# Patient Record
Sex: MALE | Race: WHITE | NOT HISPANIC OR LATINO | Employment: OTHER | URBAN - NONMETROPOLITAN AREA
[De-identification: names, ages, dates, MRNs, and addresses within clinical notes are randomized per-mention and may not be internally consistent; named-entity substitution may affect disease eponyms.]

---

## 2017-07-24 ENCOUNTER — OFFICE VISIT (OUTPATIENT)
Dept: INTERNAL MEDICINE | Facility: CLINIC | Age: 67
End: 2017-07-24

## 2017-07-24 VITALS
SYSTOLIC BLOOD PRESSURE: 128 MMHG | TEMPERATURE: 98.4 F | WEIGHT: 221 LBS | HEIGHT: 67 IN | OXYGEN SATURATION: 97 % | DIASTOLIC BLOOD PRESSURE: 82 MMHG | BODY MASS INDEX: 34.69 KG/M2 | HEART RATE: 84 BPM

## 2017-07-24 DIAGNOSIS — Z11.59 NEED FOR HEPATITIS C SCREENING TEST: ICD-10-CM

## 2017-07-24 DIAGNOSIS — Z00.00 PREVENTATIVE HEALTH CARE: ICD-10-CM

## 2017-07-24 DIAGNOSIS — Z12.11 SCREEN FOR COLON CANCER: ICD-10-CM

## 2017-07-24 DIAGNOSIS — Z23 NEED FOR PNEUMOCOCCAL VACCINATION: ICD-10-CM

## 2017-07-24 DIAGNOSIS — G25.0 ESSENTIAL TREMOR: Primary | ICD-10-CM

## 2017-07-24 PROCEDURE — G0009 ADMIN PNEUMOCOCCAL VACCINE: HCPCS | Performed by: PHYSICIAN ASSISTANT

## 2017-07-24 PROCEDURE — 90732 PPSV23 VACC 2 YRS+ SUBQ/IM: CPT | Performed by: PHYSICIAN ASSISTANT

## 2017-07-24 PROCEDURE — 99204 OFFICE O/P NEW MOD 45 MIN: CPT | Performed by: PHYSICIAN ASSISTANT

## 2017-07-24 RX ORDER — PROPRANOLOL HYDROCHLORIDE 40 MG/1
TABLET ORAL
Qty: 90 TABLET | Refills: 11 | Status: SHIPPED | OUTPATIENT
Start: 2017-07-24 | End: 2017-10-05

## 2017-07-24 RX ORDER — PROPRANOLOL HYDROCHLORIDE 40 MG/1
40 TABLET ORAL 2 TIMES DAILY
Qty: 60 TABLET | Refills: 11 | Status: SHIPPED | OUTPATIENT
Start: 2017-07-24 | End: 2017-07-24 | Stop reason: SDUPTHER

## 2017-07-24 NOTE — PROGRESS NOTES
Subjective   Adonay Rivas is a 67 y.o. male who presents to [facility] care.    Chief Complaint:  Essential tremors: Present for the last 20 years or so. Well controlled with Propranolol 40 mg bid. Had a grandmother with tremors and his daughter has also been diagnosed.     Varicose veins: worsening in the last 6-8 months and becoming painful. Describes an ache and burning sensation in the lower legs occasionally, particularly after standing for awhile.     Cataracts: Has surgery scheduled for both eyes within the next few weeks.       The following portions of the patient's history were reviewed and updated as appropriate: allergies, current medications, past family history, past medical history, past social history, past surgical history and problem list.      Review of Systems  Review of Systems   Constitutional: Negative for appetite change, chills, fatigue, fever and unexpected weight change.   HENT: Negative for congestion, ear pain, hearing loss, nosebleeds, postnasal drip, rhinorrhea, sore throat, tinnitus and trouble swallowing.    Eyes: Negative for photophobia, discharge and visual disturbance.   Respiratory: Negative for cough, chest tightness, shortness of breath and wheezing.    Cardiovascular: Positive for leg swelling. Negative for chest pain and palpitations.   Gastrointestinal: Negative for abdominal distention, abdominal pain, blood in stool, constipation, diarrhea, nausea and vomiting.   Endocrine: Negative for cold intolerance, heat intolerance, polydipsia, polyphagia and polyuria.   Genitourinary: Negative.    Musculoskeletal: Negative for arthralgias, back pain, joint swelling, myalgias, neck pain and neck stiffness.   Skin: Negative for color change, pallor, rash and wound.   Allergic/Immunologic: Negative for environmental allergies, food allergies and immunocompromised state.   Neurological: Positive for tremors. Negative for dizziness, seizures, weakness, numbness and headaches.  "  Hematological: Negative for adenopathy. Does not bruise/bleed easily.   Psychiatric/Behavioral: Negative for agitation, behavioral problems, confusion, hallucinations, self-injury and suicidal ideas. The patient is not nervous/anxious.          Objective    /82  Pulse 84  Temp 98.4 °F (36.9 °C)  Ht 67\" (170.2 cm)  Wt 221 lb (100 kg)  SpO2 97%  BMI 34.61 kg/m2  Physical Exam   Constitutional: He is oriented to person, place, and time. He appears well-developed and well-nourished. No distress.   HENT:   Head: Normocephalic and atraumatic.   Eyes: EOM are normal. Pupils are equal, round, and reactive to light.   Neck: Normal range of motion. Neck supple.   Cardiovascular: Normal rate, regular rhythm, normal heart sounds, intact distal pulses and normal pulses.  Exam reveals no gallop and no friction rub.    No murmur heard.  Varicosities bilateral lower legs, left worse than right.    Pulmonary/Chest: Effort normal and breath sounds normal. No respiratory distress. He has no wheezes. He has no rales. He exhibits no tenderness.   Abdominal: Soft. Bowel sounds are normal. He exhibits no distension. There is no tenderness.   Musculoskeletal: Normal range of motion.   Neurological: He is alert and oriented to person, place, and time. No cranial nerve deficit. He exhibits normal muscle tone.   Skin: Skin is warm and dry. He is not diaphoretic.   Psychiatric: He has a normal mood and affect. His behavior is normal. Judgment and thought content normal.   Nursing note and vitals reviewed.        Assessment/Plan     1. Essential tremor  Well controlled with Propranolol.   - propranolol (INDERAL) 40 MG tablet; Take 1 tablet by mouth 2-3 times per day for tremor.  Dispense: 90 tablet; Refill: 11    2. Screen for colon cancer  - Ambulatory Referral to General Surgery    3. Need for pneumococcal vaccination  - Pneumococcal Polysaccharide Vaccine 23-Valent Greater Than or Equal To 1yo Subcutaneous / IM    4. " Preventative health care  - Lipid Panel  - Comprehensive Metabolic Panel    5. Need for hepatitis C screening test  - Hepatitis C Antibody        Recommended Wellcome to Medicare Wellness visit in he next few months.

## 2017-07-25 ENCOUNTER — TELEPHONE (OUTPATIENT)
Dept: SURGERY | Facility: CLINIC | Age: 67
End: 2017-07-25

## 2017-07-25 LAB
ALBUMIN SERPL-MCNC: 4.1 G/DL (ref 3.5–5)
ALBUMIN/GLOB SERPL: 1.6 G/DL (ref 1–2)
ALP SERPL-CCNC: 80 U/L (ref 38–126)
ALT SERPL-CCNC: 38 U/L (ref 13–69)
AST SERPL-CCNC: 32 U/L (ref 15–46)
BILIRUB SERPL-MCNC: 1.9 MG/DL (ref 0.2–1.3)
BUN SERPL-MCNC: 13 MG/DL (ref 7–20)
BUN/CREAT SERPL: 14.4 (ref 6.3–21.9)
CALCIUM SERPL-MCNC: 10.3 MG/DL (ref 8.4–10.2)
CHLORIDE SERPL-SCNC: 104 MMOL/L (ref 98–107)
CHOLEST SERPL-MCNC: 162 MG/DL (ref 0–199)
CO2 SERPL-SCNC: 28 MMOL/L (ref 26–30)
CREAT SERPL-MCNC: 0.9 MG/DL (ref 0.6–1.3)
GLOBULIN SER CALC-MCNC: 2.6 GM/DL
GLUCOSE SERPL-MCNC: 94 MG/DL (ref 74–98)
HCV AB S/CO SERPL IA: <0.1 S/CO RATIO (ref 0–0.9)
HDLC SERPL-MCNC: 36 MG/DL (ref 40–60)
LDLC SERPL CALC-MCNC: 109 MG/DL (ref 0–99)
POTASSIUM SERPL-SCNC: 5.2 MMOL/L (ref 3.5–5.1)
PROT SERPL-MCNC: 6.7 G/DL (ref 6.3–8.2)
SODIUM SERPL-SCNC: 140 MMOL/L (ref 137–145)
TRIGL SERPL-MCNC: 85 MG/DL
VLDLC SERPL CALC-MCNC: 17 MG/DL

## 2017-08-04 ENCOUNTER — TELEPHONE (OUTPATIENT)
Dept: INTERNAL MEDICINE | Facility: CLINIC | Age: 67
End: 2017-08-04

## 2017-08-04 ENCOUNTER — TELEPHONE (OUTPATIENT)
Dept: SURGERY | Facility: CLINIC | Age: 67
End: 2017-08-04

## 2017-08-04 NOTE — TELEPHONE ENCOUNTER
I called pt several times to schedule a screening colonoscopy, he did not return my calls, I contacted 's office to inform him. I will mail a letter to pt as well.

## 2017-10-05 ENCOUNTER — OFFICE VISIT (OUTPATIENT)
Dept: INTERNAL MEDICINE | Facility: CLINIC | Age: 67
End: 2017-10-05

## 2017-10-05 VITALS
HEIGHT: 67 IN | SYSTOLIC BLOOD PRESSURE: 115 MMHG | BODY MASS INDEX: 34.39 KG/M2 | HEART RATE: 60 BPM | DIASTOLIC BLOOD PRESSURE: 80 MMHG | TEMPERATURE: 98.6 F | WEIGHT: 219.13 LBS | OXYGEN SATURATION: 97 %

## 2017-10-05 DIAGNOSIS — R35.1 NOCTURIA: ICD-10-CM

## 2017-10-05 DIAGNOSIS — G25.0 ESSENTIAL TREMOR: Primary | ICD-10-CM

## 2017-10-05 LAB — PSA SERPL-MCNC: 0.64 NG/ML (ref 0.06–4)

## 2017-10-05 PROCEDURE — G0008 ADMIN INFLUENZA VIRUS VAC: HCPCS | Performed by: INTERNAL MEDICINE

## 2017-10-05 PROCEDURE — G0402 INITIAL PREVENTIVE EXAM: HCPCS | Performed by: INTERNAL MEDICINE

## 2017-10-05 PROCEDURE — 90662 IIV NO PRSV INCREASED AG IM: CPT | Performed by: INTERNAL MEDICINE

## 2017-10-05 RX ORDER — PROPRANOLOL HCL 60 MG
60 CAPSULE, EXTENDED RELEASE 24HR ORAL DAILY
Qty: 30 CAPSULE | Refills: 5 | Status: SHIPPED | OUTPATIENT
Start: 2017-10-05 | End: 2019-10-01 | Stop reason: SDUPTHER

## 2017-10-05 RX ORDER — SILDENAFIL CITRATE 20 MG/1
60 TABLET ORAL DAILY PRN
Qty: 10 TABLET | Refills: 2 | Status: SHIPPED | OUTPATIENT
Start: 2017-10-05 | End: 2019-10-01

## 2017-10-05 NOTE — PROGRESS NOTES
QUICK REFERENCE INFORMATION:  The ABCs of the Annual Wellness Visit    [facility] to Medicare Visit    HEALTH RISK ASSESSMENT    1950    Recent Hospitalizations:  No hospitalization(s) within the last year..      Current Medical Providers:  Patient Care Team:  Checo Kidd MD as PCP - General (Internal Medicine)      Smoking Status:  History   Smoking Status   • Never Smoker   Smokeless Tobacco   • Never Used       Alcohol Consumption:  History   Alcohol Use No       Depression Screen:   PHQ-2/PHQ-9 Depression Screening 10/5/2017   Little interest or pleasure in doing things 0   Feeling down, depressed, or hopeless 0   Total Score 0       Health Habits and Functional and Cognitive Screening:  Functional & Cognitive Status 10/5/2017   Do you have difficulty preparing food and eating? No   Do you have difficulty bathing yourself? No   Do you have difficulty getting dressed? No   Do you have difficulty using the toilet? No   Do you have difficulty moving around from place to place? No   In the past year have you fallen or experienced a near fall? No   Do you need help using the phone?  No   Are you deaf or do you have serious difficulty hearing?  No   Do you need help with transportation? No   Do you need help shopping? No   Do you need help preparing meals?  No   Do you need help with housework?  No   Do you need help with laundry? No   Do you need help taking your medications? No   Do you need help managing money? No   Do you have difficulty concentrating, remembering or making decisions? No       Health Habits  Current Diet: Well Balanced Diet (very limited junk food.)  Dental Exam: Up to date  Eye Exam: Up to date  Exercise (times per week): 3 times per week  Current Exercise Activities Include: Yard Work        Does the patient have evidence of cognitive impairment? No    Aspirin use counseling? Does not need ASA (and currently is not on it)      Recent Lab Results:  CMP:  Lab Results   Component Value Date     GLU 94 07/24/2017    BUN 13 07/24/2017    CREATININE 0.90 07/24/2017    EGFRIFNONA 85 07/24/2017    EGFRIFAFRI 103 07/24/2017    BCR 14.4 07/24/2017     07/24/2017    K 5.2 (H) 07/24/2017    CO2 28.0 07/24/2017    CALCIUM 10.3 (H) 07/24/2017    PROTENTOTREF 6.7 07/24/2017    ALBUMIN 4.10 07/24/2017    LABGLOBREF 2.6 07/24/2017    LABIL2 1.6 07/24/2017    BILITOT 1.9 (H) 07/24/2017    ALKPHOS 80 07/24/2017    AST 32 07/24/2017    ALT 38 07/24/2017     Lipid Panel:  Lab Results   Component Value Date    TRIG 85 07/24/2017    HDL 36 (L) 07/24/2017    VLDL 17 07/24/2017     HbA1c:       Visual Acuity:  No exam data present    Age-appropriate Screening Schedule:  Refer to the list below for future screening recommendations based on patient's age, sex and/or medical conditions. Orders for these recommended tests are listed in the plan section. The patient has been provided with a written plan.    Health Maintenance   Topic Date Due   • TDAP/TD VACCINES (1 - Tdap) 07/05/1971   • COLONOSCOPY  07/24/2017   • ZOSTER VACCINE  07/24/2017   • INFLUENZA VACCINE  08/01/2017   • PNEUMOCOCCAL VACCINES (65+ LOW/MEDIUM RISK) (2 of 2 - PCV13) 07/24/2018        Subjective   History of Present Illness    Adonay Rivas is a 67 y.o. male an established patient presenting for a Welcome to Medicare Visit.     The following portions of the patient's history were reviewed and updated as appropriate: allergies, current medications, past family history, past medical history, past social history, past surgical history and problem list.    Outpatient Medications Prior to Visit   Medication Sig Dispense Refill   • propranolol (INDERAL) 40 MG tablet Take 1 tablet by mouth 2-3 times per day for tremor. 90 tablet 11     No facility-administered medications prior to visit.        Patient Active Problem List   Diagnosis   • Essential tremor       Advance Care Planning:  has an advance directive - a copy HAS NOT been provided. Have asked the  patient to send this to us to add to record.    Identification of Risk Factors:  Risk factors include: weight .    Review of Systems   Constitutional: Negative.  Negative for activity change, appetite change, fatigue and fever.   HENT: Negative for congestion, ear discharge, ear pain and trouble swallowing.    Eyes: Negative for photophobia and visual disturbance.   Respiratory: Negative for cough and shortness of breath.    Cardiovascular: Negative for chest pain and palpitations.   Gastrointestinal: Negative for abdominal distention, abdominal pain, constipation, diarrhea, nausea and vomiting.   Endocrine: Negative.    Genitourinary: Positive for difficulty urinating. Negative for dysuria, hematuria and urgency.        ED   Musculoskeletal: Negative for arthralgias, back pain, joint swelling and myalgias.   Skin: Negative for color change and rash.   Allergic/Immunologic: Negative.    Neurological: Positive for tremors. Negative for dizziness, weakness, light-headedness and headaches.   Hematological: Negative for adenopathy. Does not bruise/bleed easily.   Psychiatric/Behavioral: Negative for agitation, confusion and dysphoric mood. The patient is not nervous/anxious.        Compared to one year ago, the patient feels his physical health is the same.  Compared to one year ago, the patient feels his mental health is the same.    Objective    Physical Exam   Constitutional: He is oriented to person, place, and time. He appears well-developed and well-nourished. He is cooperative. No distress.   HENT:   Head: Normocephalic and atraumatic.   Right Ear: External ear normal.   Left Ear: External ear normal.   Nose: Nose normal.   Mouth/Throat: Oropharynx is clear and moist. No oropharyngeal exudate.   Eyes: Conjunctivae and EOM are normal. Pupils are equal, round, and reactive to light. Right eye exhibits no discharge. Left eye exhibits no discharge. No scleral icterus.   Neck: Normal range of motion. No tracheal  "deviation present. No thyromegaly present.   Cardiovascular: Normal rate, regular rhythm, normal heart sounds and intact distal pulses.  Exam reveals no gallop and no friction rub.    No murmur heard.  Pulmonary/Chest: Effort normal and breath sounds normal. No stridor. No respiratory distress. He has no wheezes. He has no rales. He exhibits no tenderness.   Abdominal: Soft. Bowel sounds are normal. He exhibits no distension and no mass. There is no tenderness. There is no rebound and no guarding. No hernia.   Genitourinary: Rectum normal and prostate normal.   Musculoskeletal: Normal range of motion. He exhibits no tenderness or deformity.   Lymphadenopathy:     He has no cervical adenopathy.   Neurological: He is alert and oriented to person, place, and time. He has normal reflexes. He displays no tremor. No cranial nerve deficit or sensory deficit. Coordination and gait normal.   tremors   Skin: Skin is warm and dry. No rash noted. No erythema.   Psychiatric: He has a normal mood and affect. His speech is normal and behavior is normal. Judgment and thought content normal. His mood appears not anxious. Cognition and memory are normal. He does not exhibit a depressed mood.       Vitals:    10/05/17 1048   BP: 115/80   Pulse: 60   Temp: 98.6 °F (37 °C)   SpO2: 97%   Weight: 219 lb 2 oz (99.4 kg)   Height: 67\" (170.2 cm)       Body mass index is 34.32 kg/(m^2).  Discussed the patient's BMI with him. The BMI is above average; BMI management plan is completed.    Procedure   Procedures       Assessment/Plan   Patient Self-Management and Personalized Health Advice  The patient has been provided with information about: diet and preventive services including:   · Colorectal cancer screening, colonoscopy referral placed, Exercise counseling provided, Influenza vaccine.    Visit Diagnoses:    ICD-10-CM ICD-9-CM   1. Essential tremor. Titrate propranolol dose up and to LA formulation G25.0 333.1       Orders Placed This " Encounter   Procedures   • Flu Vaccine High Dose PF 65YR+       Outpatient Encounter Prescriptions as of 10/5/2017   Medication Sig Dispense Refill   • propranolol (INDERAL) 40 MG tablet Take 1 tablet by mouth 2-3 times per day for tremor. 90 tablet 11     No facility-administered encounter medications on file as of 10/5/2017.        Reviewed use of high risk medication in the elderly: yes  Reviewed for potential of harmful drug interactions in the elderly: yes    Follow Up:  No Follow-up on file.     An After Visit Summary and PPPS with all of these plans were given to the patient.

## 2017-10-05 NOTE — PATIENT INSTRUCTIONS
Medicare Wellness  Personal Prevention Plan of Service     Date of Office Visit:  10/05/2017  Encounter Provider:  Checo Kidd MD  Place of Service:  [facility]  Patient Name: Adonay Rivas  :  1950    As part of the Medicare Wellness portion of your visit today, we are providing you with this personalized preventive plan of services (PPPS). This plan is based upon recommendations of the [place] Preventive Services Task Force (USPSTF) and the Advisory Committee on Immunization Practices (ACIP).    This lists the preventive care services that should be considered, and provides dates of when you are due. Items listed as completed are up-to-date and do not require any further intervention.    Health Maintenance   Topic Date Due   • TDAP/TD VACCINES (1 - Tdap) 1971   • MEDICARE ANNUAL WELLNESS  2017   • COLONOSCOPY  2017   • ZOSTER VACCINE  2017   • INFLUENZA VACCINE  2017   • PNEUMOCOCCAL VACCINES (65+ LOW/MEDIUM RISK) (2 of 2 - PCV13) 2018   • HEPATITIS C SCREENING  Completed       Orders Placed This Encounter   Procedures   • Flu Vaccine High Dose PF 65YR+   • PSA       No Follow-up on file.

## 2017-10-10 ENCOUNTER — TELEPHONE (OUTPATIENT)
Dept: INTERNAL MEDICINE | Facility: CLINIC | Age: 67
End: 2017-10-10

## 2017-10-10 ENCOUNTER — TELEPHONE (OUTPATIENT)
Dept: SURGERY | Facility: CLINIC | Age: 67
End: 2017-10-10

## 2017-10-10 NOTE — TELEPHONE ENCOUNTER
----- Message from Checo Kidd MD sent at 10/6/2017  1:16 PM EDT -----  Please inform patient labs are okay.

## 2017-10-10 NOTE — TELEPHONE ENCOUNTER
"Per Dr. Kidd's office I scheduled pt for an \"open access' screening colonoscopy @  on 11/13/217, all pertinant information mailed to pt.  "

## 2017-10-11 ENCOUNTER — PREP FOR SURGERY (OUTPATIENT)
Dept: OTHER | Facility: HOSPITAL | Age: 67
End: 2017-10-11

## 2017-10-11 DIAGNOSIS — Z12.11 SCREENING FOR COLON CANCER: Primary | ICD-10-CM

## 2017-10-17 PROBLEM — Z12.11 SCREENING FOR COLON CANCER: Status: ACTIVE | Noted: 2017-10-17

## 2017-10-30 ENCOUNTER — TELEPHONE (OUTPATIENT)
Dept: SURGERY | Facility: CLINIC | Age: 67
End: 2017-10-30

## 2017-10-30 NOTE — TELEPHONE ENCOUNTER
"Pt was scheduled for an \"open access\" colonoscopy, he called the office and I returned his call. He stated that he wanted to cancel his colonoscopy and  reschedule for \"sometime\" in January, he will call back in January to reschedule.  "

## 2019-10-01 ENCOUNTER — HOSPITAL ENCOUNTER (OUTPATIENT)
Dept: CT IMAGING | Facility: HOSPITAL | Age: 69
Discharge: HOME | End: 2019-10-01
Admitting: PHYSICIAN ASSISTANT

## 2019-10-01 ENCOUNTER — OFFICE VISIT (OUTPATIENT)
Dept: INTERNAL MEDICINE | Facility: CLINIC | Age: 69
End: 2019-10-01

## 2019-10-01 VITALS
BODY MASS INDEX: 34.06 KG/M2 | HEIGHT: 67 IN | HEART RATE: 78 BPM | WEIGHT: 217 LBS | TEMPERATURE: 98.2 F | SYSTOLIC BLOOD PRESSURE: 124 MMHG | DIASTOLIC BLOOD PRESSURE: 72 MMHG | OXYGEN SATURATION: 98 %

## 2019-10-01 DIAGNOSIS — R31.0 GROSS HEMATURIA: ICD-10-CM

## 2019-10-01 DIAGNOSIS — N28.1 RENAL CYST: ICD-10-CM

## 2019-10-01 DIAGNOSIS — K76.89 HEPATIC CYST: Primary | ICD-10-CM

## 2019-10-01 DIAGNOSIS — Z87.442 HISTORY OF NEPHROLITHIASIS: ICD-10-CM

## 2019-10-01 DIAGNOSIS — R10.9 RIGHT FLANK PAIN: Primary | ICD-10-CM

## 2019-10-01 LAB
BILIRUB BLD-MCNC: NEGATIVE MG/DL
CLARITY, POC: CLEAR
COLOR UR: YELLOW
GLUCOSE UR STRIP-MCNC: ABNORMAL MG/DL
KETONES UR QL: NEGATIVE
LEUKOCYTE EST, POC: NEGATIVE
NITRITE UR-MCNC: NEGATIVE MG/ML
PH UR: 5.5 [PH] (ref 5–8)
PROT UR STRIP-MCNC: NEGATIVE MG/DL
RBC # UR STRIP: ABNORMAL /UL
SP GR UR: 1.03 (ref 1–1.03)
UROBILINOGEN UR QL: NORMAL

## 2019-10-01 PROCEDURE — 99213 OFFICE O/P EST LOW 20 MIN: CPT | Performed by: PHYSICIAN ASSISTANT

## 2019-10-01 PROCEDURE — 81003 URINALYSIS AUTO W/O SCOPE: CPT | Performed by: PHYSICIAN ASSISTANT

## 2019-10-01 PROCEDURE — 74176 CT ABD & PELVIS W/O CONTRAST: CPT

## 2019-10-01 RX ORDER — PROPRANOLOL HCL 60 MG
60 CAPSULE, EXTENDED RELEASE 24HR ORAL DAILY
Qty: 30 CAPSULE | Refills: 1 | Status: SHIPPED | OUTPATIENT
Start: 2019-10-01 | End: 2019-11-07 | Stop reason: SDUPTHER

## 2019-10-01 NOTE — PROGRESS NOTES
Adonay Rivas is a 69 y.o. male.     Subjective   History of Present Illness   Here today with concern of right flank pain for the last 4 days intermittently which worsened yesterday.  He has also had some right lower abdominal pain.  No fever, chills, nausea, diarrhea, constipation, low back pain, dysuria, hematuria, increased frequency or urgency.  He does have a history of one kidney stone many years ago which required intervention.         The following portions of the patient's history were reviewed and updated as appropriate: allergies, current medications, past family history, past medical history, past social history, past surgical history and problem list.    Review of Systems   Constitutional: Negative for appetite change, chills, fatigue, fever and unexpected weight change.   Eyes: Negative for visual disturbance.   Respiratory: Negative for cough, chest tightness, shortness of breath and wheezing.    Cardiovascular: Negative for chest pain, palpitations and leg swelling.   Gastrointestinal: Positive for abdominal pain. Negative for abdominal distention, anal bleeding, blood in stool, constipation, diarrhea, nausea, rectal pain and vomiting.   Endocrine: Negative for heat intolerance, polydipsia, polyphagia and polyuria.   Genitourinary: Positive for flank pain. Negative for decreased urine volume, difficulty urinating, dysuria, enuresis, frequency, hematuria, penile pain, penile swelling, scrotal swelling, testicular pain and urgency.   Musculoskeletal: Positive for back pain. Negative for arthralgias, gait problem, joint swelling, myalgias, neck pain and neck stiffness.   Skin: Negative.    Allergic/Immunologic: Negative for immunocompromised state.   Neurological: Negative for dizziness, tremors, syncope, facial asymmetry, numbness and headaches.   Hematological: Negative for adenopathy. Does not bruise/bleed easily.   Psychiatric/Behavioral: Negative for behavioral problems, decreased concentration,  "dysphoric mood, self-injury and suicidal ideas. The patient is not nervous/anxious.          Objective    Physical Exam   Constitutional: He is oriented to person, place, and time. He appears well-developed and well-nourished. No distress.   HENT:   Head: Normocephalic and atraumatic.   Eyes: Conjunctivae and EOM are normal. Pupils are equal, round, and reactive to light.   Neck: Normal range of motion. Neck supple.   Cardiovascular: Normal rate, regular rhythm and normal heart sounds. Exam reveals no gallop and no friction rub.   No murmur heard.  Pulmonary/Chest: Effort normal and breath sounds normal. No stridor. No respiratory distress. He has no wheezes. He has no rales. He exhibits no tenderness.   Abdominal: Soft. Bowel sounds are normal. He exhibits no distension and no mass. There is tenderness. There is CVA tenderness. There is no rebound and no guarding. No hernia.   Musculoskeletal: Normal range of motion. He exhibits no edema, tenderness or deformity.   Lymphadenopathy:     He has no cervical adenopathy.   Neurological: He is alert and oriented to person, place, and time. No cranial nerve deficit. Coordination normal.   Skin: Skin is warm and dry. Capillary refill takes less than 2 seconds. No rash noted. He is not diaphoretic.   Psychiatric: He has a normal mood and affect. His behavior is normal. Judgment and thought content normal.   Nursing note and vitals reviewed.        /72   Pulse 78   Temp 98.2 °F (36.8 °C)   Ht 170.2 cm (67.01\")   Wt 98.4 kg (217 lb)   SpO2 98%   BMI 33.98 kg/m²     Nursing note and vitals reviewed.          Assessment/Plan   Adonay was seen today for flank pain.    Diagnoses and all orders for this visit:    Right flank pain  -     POC Urinalysis Dipstick, Automated  -     Urine Culture - Urine, Urine, Clean Catch  -     CT Abdomen Pelvis Stone Protocol    Brief Urine Lab Results  (Last result in the past 365 days)      Color   Clarity   Blood   Leuk Est   Nitrite "   Protein   CREAT   Urine HCG        10/01/19 1428 Yellow Clear 250 Mark/ul Negative Negative Negative             Gross hematuria  -     CT Abdomen Pelvis Stone Protocol    History of nephrolithiasis  -     CT Abdomen Pelvis Stone Protocol      Suspect right nephrolithiasis given symptoms and physical exam findings.  Since his only other experience with a kidney stone required intervention he is in favor of proceeding with CT stone protocol.    He was encouraged to increase water intake and use Tylenol and/or ibuprofen as needed for pain control.

## 2019-10-03 LAB
BACTERIA UR CULT: NORMAL
BACTERIA UR CULT: NORMAL

## 2019-11-07 ENCOUNTER — OFFICE VISIT (OUTPATIENT)
Dept: INTERNAL MEDICINE | Facility: CLINIC | Age: 69
End: 2019-11-07

## 2019-11-07 ENCOUNTER — TELEPHONE (OUTPATIENT)
Dept: SURGERY | Facility: CLINIC | Age: 69
End: 2019-11-07

## 2019-11-07 VITALS
BODY MASS INDEX: 33.59 KG/M2 | HEIGHT: 67 IN | HEART RATE: 85 BPM | SYSTOLIC BLOOD PRESSURE: 125 MMHG | WEIGHT: 214 LBS | OXYGEN SATURATION: 100 % | TEMPERATURE: 97.8 F | DIASTOLIC BLOOD PRESSURE: 84 MMHG

## 2019-11-07 DIAGNOSIS — E78.00 PURE HYPERCHOLESTEROLEMIA: ICD-10-CM

## 2019-11-07 DIAGNOSIS — Z12.11 SCREEN FOR COLON CANCER: ICD-10-CM

## 2019-11-07 DIAGNOSIS — M79.604 PAIN OF RIGHT LOWER EXTREMITY: Primary | ICD-10-CM

## 2019-11-07 LAB
ALBUMIN SERPL-MCNC: 4.4 G/DL (ref 3.5–5.2)
ALBUMIN/GLOB SERPL: 2.1 G/DL
ALP SERPL-CCNC: 105 U/L (ref 39–117)
ALT SERPL-CCNC: 17 U/L (ref 1–41)
AST SERPL-CCNC: 18 U/L (ref 1–40)
BILIRUB SERPL-MCNC: 1.1 MG/DL (ref 0.2–1.2)
BUN SERPL-MCNC: 11 MG/DL (ref 8–23)
BUN/CREAT SERPL: 11.8 (ref 7–25)
CALCIUM SERPL-MCNC: 10.1 MG/DL (ref 8.6–10.5)
CHLORIDE SERPL-SCNC: 102 MMOL/L (ref 98–107)
CHOLEST SERPL-MCNC: 139 MG/DL (ref 0–200)
CO2 SERPL-SCNC: 28.2 MMOL/L (ref 22–29)
CREAT SERPL-MCNC: 0.93 MG/DL (ref 0.76–1.27)
GLOBULIN SER CALC-MCNC: 2.1 GM/DL
GLUCOSE SERPL-MCNC: 93 MG/DL (ref 65–99)
HDLC SERPL-MCNC: 36 MG/DL (ref 40–60)
LDLC SERPL CALC-MCNC: 87 MG/DL (ref 0–100)
POTASSIUM SERPL-SCNC: 4.7 MMOL/L (ref 3.5–5.2)
PROT SERPL-MCNC: 6.5 G/DL (ref 6–8.5)
SODIUM SERPL-SCNC: 140 MMOL/L (ref 136–145)
TRIGL SERPL-MCNC: 78 MG/DL (ref 0–150)
VLDLC SERPL CALC-MCNC: 15.6 MG/DL

## 2019-11-07 PROCEDURE — G0439 PPPS, SUBSEQ VISIT: HCPCS | Performed by: INTERNAL MEDICINE

## 2019-11-07 RX ORDER — PROPRANOLOL HCL 60 MG
60 CAPSULE, EXTENDED RELEASE 24HR ORAL DAILY
Qty: 90 CAPSULE | Refills: 3 | Status: SHIPPED | OUTPATIENT
Start: 2019-11-07 | End: 2022-04-01 | Stop reason: SDUPTHER

## 2019-11-07 RX ORDER — PROPRANOLOL HYDROCHLORIDE 40 MG/1
40 TABLET ORAL 3 TIMES DAILY PRN
Qty: 60 TABLET | Refills: 2 | Status: SHIPPED | OUTPATIENT
Start: 2019-11-07

## 2019-11-07 RX ORDER — LATANOPROST 50 UG/ML
1 SOLUTION/ DROPS OPHTHALMIC DAILY
Refills: 3 | COMMUNITY
Start: 2019-10-02

## 2019-11-07 NOTE — PROGRESS NOTES
The ABCs of the Annual Wellness Visit  Subsequent Medicare Wellness Visit    Chief Complaint   Patient presents with   • Medicare Wellness-subsequent     nerve issue right hip, shoots down leg, comes and goes        Subjective   History of Present Illness:  Adonay Rivas is a 69 y.o. male who presents for a Subsequent Medicare Wellness Visit.    HEALTH RISK ASSESSMENT    Recent Hospitalizations:  No hospitalization(s) within the last year.    Current Medical Providers:  Patient Care Team:  Checo Kidd MD as PCP - General (Internal Medicine)  Makenzie Moura MD as PCP - Claims Attributed    Smoking Status:  Social History     Tobacco Use   Smoking Status Never Smoker   Smokeless Tobacco Never Used       Alcohol Consumption:  Social History     Substance and Sexual Activity   Alcohol Use No       Depression Screen:   PHQ-2/PHQ-9 Depression Screening 11/7/2019   Little interest or pleasure in doing things 0   Feeling down, depressed, or hopeless 0   Total Score 0       Fall Risk Screen:  PRAVIN Fall Risk Assessment was completed, and patient is at LOW risk for falls.Assessment completed on:11/7/2019    Health Habits and Functional and Cognitive Screening:  Functional & Cognitive Status 11/7/2019   Do you have difficulty preparing food and eating? No   Do you have difficulty bathing yourself, getting dressed or grooming yourself? No   Do you have difficulty using the toilet? No   Do you have difficulty moving around from place to place? No   Do you have trouble with steps or getting out of a bed or a chair? No   Current Diet Well Balanced Diet   Dental Exam Up to date   Eye Exam Up to date   Exercise (times per week) 0 times per week   Current Exercise Activities Include No Regular Exercise   Do you need help using the phone?  No   Are you deaf or do you have serious difficulty hearing?  No   Do you need help with transportation? No   Do you need help shopping? No   Do you need help preparing meals?  No   Do you  need help with housework?  No   Do you need help with laundry? No   Do you need help taking your medications? No   Do you need help managing money? No   Do you ever drive or ride in a car without wearing a seat belt? No   Have you felt unusual stress, anger or loneliness in the last month? No   Who do you live with? Spouse   If you need help, do you have trouble finding someone available to you? No   Have you been bothered in the last four weeks by sexual problems? No   Do you have difficulty concentrating, remembering or making decisions? No         Does the patient have evidence of cognitive impairment? No    Asprin use counseling:Does not need ASA (and currently is not on it)    Age-appropriate Screening Schedule:  Refer to the list below for future screening recommendations based on patient's age, sex and/or medical conditions. Orders for these recommended tests are listed in the plan section. The patient has been provided with a written plan.    Health Maintenance   Topic Date Due   • COLONOSCOPY  07/24/2017   • TDAP/TD VACCINES (1 - Tdap) 11/07/2020 (Originally 7/5/1969)   • PNEUMOCOCCAL VACCINES (65+ LOW/MEDIUM RISK) (2 of 2 - PCV13) 11/07/2029 (Originally 7/24/2018)   • ZOSTER VACCINE (1 of 2) 11/07/2029 (Originally 7/5/2002)   • INFLUENZA VACCINE  Completed          The following portions of the patient's history were reviewed and updated as appropriate: allergies, current medications, past family history, past medical history, past social history, past surgical history and problem list.    Outpatient Medications Prior to Visit   Medication Sig Dispense Refill   • latanoprost (XALATAN) 0.005 % ophthalmic solution INSTILL 1 DROP INTO EACH EYE ONCE DAILY AT NIGHT AT BEDTIME  3   • propranolol LA (INDERAL LA) 60 MG 24 hr capsule Take 1 capsule by mouth Daily. 30 capsule 1     No facility-administered medications prior to visit.        Patient Active Problem List   Diagnosis   • Essential tremor   • Screening  "for colon cancer       Advanced Care Planning:  Patient has an advance directive - a copy has not been provided. Have asked the patient to send this to us to add to record    Review of Systems   Constitutional: Negative.  Negative for activity change, appetite change, fatigue and fever.   HENT: Negative for congestion, ear discharge, ear pain and trouble swallowing.    Eyes: Negative for photophobia and visual disturbance.   Respiratory: Negative for cough and shortness of breath.    Cardiovascular: Negative for chest pain and palpitations.   Gastrointestinal: Negative for abdominal distention, abdominal pain, constipation, diarrhea, nausea and vomiting.   Endocrine: Negative.    Genitourinary: Negative for dysuria, hematuria and urgency.   Musculoskeletal: Positive for arthralgias. Negative for back pain, joint swelling and myalgias.   Skin: Negative for color change and rash.   Allergic/Immunologic: Negative.    Neurological: Negative for dizziness, weakness, light-headedness and headaches.   Hematological: Negative for adenopathy. Does not bruise/bleed easily.   Psychiatric/Behavioral: Negative for agitation, confusion and dysphoric mood. The patient is not nervous/anxious.        Compared to one year ago, the patient feels his physical health is the same.  Compared to one year ago, the patient feels his mental health is the same.    Reviewed chart for potential of high risk medication in the elderly: yes  Reviewed chart for potential of harmful drug interactions in the elderly:yes    Objective         Vitals:    11/07/19 0933   BP: 125/84  Comment: Automatic   Pulse: 85   Temp: 97.8 °F (36.6 °C)   TempSrc: Temporal   SpO2: 100%   Weight: 97.1 kg (214 lb)   Height: 170.2 cm (67\")   PainSc: 0-No pain       Body mass index is 33.52 kg/m².  Discussed the patient's BMI with him. The BMI is above average; BMI management plan is completed.    Physical Exam   Constitutional: He is oriented to person, place, and time. He " appears well-developed and well-nourished. No distress.   HENT:   Nose: Nose normal.   Mouth/Throat: Oropharynx is clear and moist.   Eyes: Conjunctivae and EOM are normal. No scleral icterus.   Neck: No tracheal deviation present. No thyromegaly present.   Cardiovascular: Normal rate and regular rhythm. Exam reveals no friction rub.   No murmur heard.  Pulmonary/Chest: No respiratory distress. He has no wheezes. He has no rales.   Abdominal: Soft. He exhibits no distension and no mass. There is no tenderness. There is no guarding.   Musculoskeletal: Normal range of motion. He exhibits no deformity.   Lymphadenopathy:     He has no cervical adenopathy.   Neurological: He is alert and oriented to person, place, and time. He has normal reflexes. No cranial nerve deficit. Coordination normal.   tremors   Skin: Skin is warm and dry. No rash noted. No erythema.   Psychiatric: He has a normal mood and affect. His behavior is normal. Judgment and thought content normal.             Assessment/Plan   Medicare Risks and Personalized Health Plan  CMS Preventative Services Quick Reference  Advance Directive Discussion    The above risks/problems have been discussed with the patient.  Pertinent information has been shared with the patient in the After Visit Summary.  Follow up plans and orders are seen below in the Assessment/Plan Section.    Diagnoses and all orders for this visit:    1. Pain of right lower extremity (Primary) has had episodic shooting pain in his right lower extremity this occurs 2-3 times a month.  Will observe for now.  Exam of the hip joint and back is unremarkable.  No rash noted has not had similar complaints in the past.  Will pursue further investigations if symptoms persist or get exacerbated.  Screening colonoscopy scheduled  Check lipid and CMP    Other orders  -     propranolol LA (INDERAL LA) 60 MG 24 hr capsule; Take 1 capsule by mouth Daily.  Dispense: 90 capsule; Refill: 3      Follow Up:  No  Follow-up on file.     An After Visit Summary and PPPS were given to the patient.

## 2019-11-22 ENCOUNTER — PREP FOR SURGERY (OUTPATIENT)
Dept: OTHER | Facility: HOSPITAL | Age: 69
End: 2019-11-22

## 2019-11-22 ENCOUNTER — TELEPHONE (OUTPATIENT)
Dept: SURGERY | Facility: CLINIC | Age: 69
End: 2019-11-22

## 2019-11-22 DIAGNOSIS — Z12.11 COLON CANCER SCREENING: Primary | ICD-10-CM

## 2019-11-22 RX ORDER — BISACODYL 5 MG/1
TABLET, DELAYED RELEASE ORAL
Qty: 4 TABLET | Refills: 0 | Status: SHIPPED | OUTPATIENT
Start: 2019-11-22 | End: 2021-02-25

## 2019-11-22 RX ORDER — POLYETHYLENE GLYCOL 3350 17 G/17G
238 POWDER, FOR SOLUTION ORAL ONCE
Qty: 1 EACH | Refills: 1 | Status: SHIPPED | OUTPATIENT
Start: 2019-11-22 | End: 2019-11-22

## 2019-11-22 NOTE — TELEPHONE ENCOUNTER
"Pt scheduled for open access screening colonoscopyPRESCREENING FOR OPEN ACCESS SCHEDULING    Adonay Rivas, 1950  [id number]    11/22/19    If, the patient answers yes to any of the following questions the provider will be informed prior to scheduling open access for approval and documented in the chart.    [x]  Yes  [] No    1. Have you ever had a colonoscopy in the past?      When:  \"several years\"      Where:       Polyps or other:     []  Yes  [x] No    2. Family history of colon cancer?      Relation:       Age of onset:       Do you currently have any of the following?    []  Yes  [x] No  Rectal bleeding, if so, how long?     []  Yes  [x] No  Abdominal pain, if so, how long?    []  Yes  [x] No  Constipation, if so, how long?    []  Yes  [x] No  Diarrhea, if so, how long?    []  Yes  [x] No  Weight loss, is so, how much?    [] Yes  [x] No  Small caliber stool, if so, how long?      Have you ever had any of the following conditions?    [] Yes  [x] No  Heart attack?      When?       Last cardiac workup?     Blood thinners?    [] Yes  [x] No   Lung problems, asthma or COPD?  [] Yes  [x] No  Oxygen required?       [] Yes  [x] No  Stroke?     [] Yes  [x] No  Have you ever had a reaction to anesthesia?          @  on 01/13/2020.  "

## 2019-11-27 PROBLEM — Z12.11 COLON CANCER SCREENING: Status: ACTIVE | Noted: 2019-11-27

## 2020-01-13 ENCOUNTER — ANESTHESIA (OUTPATIENT)
Dept: GASTROENTEROLOGY | Facility: HOSPITAL | Age: 70
End: 2020-01-13

## 2020-01-13 ENCOUNTER — HOSPITAL ENCOUNTER (OUTPATIENT)
Facility: HOSPITAL | Age: 70
Setting detail: HOSPITAL OUTPATIENT SURGERY
Discharge: HOME | End: 2020-01-13
Attending: SURGERY | Admitting: SURGERY

## 2020-01-13 ENCOUNTER — ANESTHESIA EVENT (OUTPATIENT)
Dept: GASTROENTEROLOGY | Facility: HOSPITAL | Age: 70
End: 2020-01-13

## 2020-01-13 VITALS
DIASTOLIC BLOOD PRESSURE: 64 MMHG | RESPIRATION RATE: 20 BRPM | SYSTOLIC BLOOD PRESSURE: 118 MMHG | HEIGHT: 67 IN | TEMPERATURE: 98.2 F | BODY MASS INDEX: 32.21 KG/M2 | OXYGEN SATURATION: 94 % | HEART RATE: 72 BPM | WEIGHT: 205.25 LBS

## 2020-01-13 DIAGNOSIS — Z12.11 COLON CANCER SCREENING: ICD-10-CM

## 2020-01-13 PROCEDURE — 25010000002 FENTANYL CITRATE (PF) 100 MCG/2ML SOLUTION: Performed by: NURSE ANESTHETIST, CERTIFIED REGISTERED

## 2020-01-13 PROCEDURE — 25010000002 PROPOFOL 1000 MG/100ML EMULSION: Performed by: NURSE ANESTHETIST, CERTIFIED REGISTERED

## 2020-01-13 PROCEDURE — 25010000002 MIDAZOLAM PER 1MG: Performed by: NURSE ANESTHETIST, CERTIFIED REGISTERED

## 2020-01-13 PROCEDURE — 88305 TISSUE EXAM BY PATHOLOGIST: CPT | Performed by: SURGERY

## 2020-01-13 PROCEDURE — S0260 H&P FOR SURGERY: HCPCS | Performed by: SURGERY

## 2020-01-13 RX ORDER — FENTANYL CITRATE 50 UG/ML
INJECTION, SOLUTION INTRAMUSCULAR; INTRAVENOUS AS NEEDED
Status: DISCONTINUED | OUTPATIENT
Start: 2020-01-13 | End: 2020-01-13 | Stop reason: SURG

## 2020-01-13 RX ORDER — SODIUM CHLORIDE 0.9 % (FLUSH) 0.9 %
10 SYRINGE (ML) INJECTION AS NEEDED
Status: DISCONTINUED | OUTPATIENT
Start: 2020-01-13 | End: 2020-01-13 | Stop reason: HOSPADM

## 2020-01-13 RX ORDER — ONDANSETRON 2 MG/ML
4 INJECTION INTRAMUSCULAR; INTRAVENOUS ONCE AS NEEDED
Status: DISCONTINUED | OUTPATIENT
Start: 2020-01-13 | End: 2020-01-13 | Stop reason: HOSPADM

## 2020-01-13 RX ORDER — SODIUM CHLORIDE, SODIUM LACTATE, POTASSIUM CHLORIDE, CALCIUM CHLORIDE 600; 310; 30; 20 MG/100ML; MG/100ML; MG/100ML; MG/100ML
1000 INJECTION, SOLUTION INTRAVENOUS CONTINUOUS
Status: DISCONTINUED | OUTPATIENT
Start: 2020-01-13 | End: 2020-01-13 | Stop reason: HOSPADM

## 2020-01-13 RX ORDER — LIDOCAINE HYDROCHLORIDE 20 MG/ML
INJECTION, SOLUTION INTRAVENOUS AS NEEDED
Status: DISCONTINUED | OUTPATIENT
Start: 2020-01-13 | End: 2020-01-13 | Stop reason: SURG

## 2020-01-13 RX ORDER — MIDAZOLAM HYDROCHLORIDE 2 MG/2ML
INJECTION, SOLUTION INTRAMUSCULAR; INTRAVENOUS AS NEEDED
Status: DISCONTINUED | OUTPATIENT
Start: 2020-01-13 | End: 2020-01-13 | Stop reason: SURG

## 2020-01-13 RX ORDER — PROPOFOL 10 MG/ML
INJECTION, EMULSION INTRAVENOUS AS NEEDED
Status: DISCONTINUED | OUTPATIENT
Start: 2020-01-13 | End: 2020-01-13 | Stop reason: SURG

## 2020-01-13 RX ORDER — MAGNESIUM HYDROXIDE 1200 MG/15ML
LIQUID ORAL AS NEEDED
Status: DISCONTINUED | OUTPATIENT
Start: 2020-01-13 | End: 2020-01-13 | Stop reason: HOSPADM

## 2020-01-13 RX ADMIN — MIDAZOLAM HYDROCHLORIDE 2 MG: 1 INJECTION, SOLUTION INTRAMUSCULAR; INTRAVENOUS at 07:19

## 2020-01-13 RX ADMIN — PROPOFOL 50 MG: 10 INJECTION, EMULSION INTRAVENOUS at 07:19

## 2020-01-13 RX ADMIN — LIDOCAINE HYDROCHLORIDE 100 MG: 20 INJECTION, SOLUTION INTRAVENOUS at 07:19

## 2020-01-13 RX ADMIN — FENTANYL CITRATE 100 MCG: 50 INJECTION, SOLUTION INTRAMUSCULAR; INTRAVENOUS at 07:19

## 2020-01-13 RX ADMIN — SODIUM CHLORIDE, POTASSIUM CHLORIDE, SODIUM LACTATE AND CALCIUM CHLORIDE 1000 ML: 600; 310; 30; 20 INJECTION, SOLUTION INTRAVENOUS at 06:30

## 2020-01-13 RX ADMIN — PROPOFOL 50 MG: 10 INJECTION, EMULSION INTRAVENOUS at 07:27

## 2020-01-13 NOTE — H&P
[facility]   HISTORY AND PHYSICAL      Name:  Adonay Rivas   Age:  69 y.o.  Sex:  male  :  1950  MRN:  [id number]   Visit Number:  [id number]  Admission Date:  2020  Date Of Service:  20  Primary Care Physician:  Checo Kidd MD    Chief Complaint:     Colon cancer screening    History Of Presenting Illness:      Patient here for routine colonoscopy.  Last was over 5 years ago.  Patient with no history of polyps and no family history of colon cancer.  No rectal bleeding or abdominal pain.    Review Of Systems:     General ROS: Patient denies any fevers, chills or loss of consciousness.  No complaints of generalized weakness  Psychological ROS: Denies any hallucinations and delusions.  Ophthalmic ROS: no transient loss of vision.  ENT ROS: Denies sore throat, nasal congestion or ear pain.   Allergy and Immunology ROS: Denies rash or itching.  Hematological and Lymphatic ROS: Denies neck swelling or easy bleeding.  Endocrine ROS: Denies any recent unintentional weight gain or loss.  Breast ROS: Denies any pain or swelling.  Respiratory ROS: Denies cough or shortness of breath.   Cardiovascular ROS: Denies chest pain or palpitations. No history of exertional chest pain.   Gastrointestinal ROS: Denies nausea and vomiting. Denies any abdominal pain. No diarrhea.   Genito-Urinary ROS: Denies dysuria or hematuria.  Musculoskeletal ROS: no back pain. No muscle pain. No calf pain.   Neurological ROS: Denies any focal weakness. No loss of consciousness. Denies any numbness.   Dermatological ROS: Denies any redness or pruritis.     Past Medical History:    Past Medical History:   Diagnosis Date   • Cataract    • Kidney stone    • Tremors of nervous system     essential        Past Surgical history:    Past Surgical History:   Procedure Laterality Date   • CATARACT EXTRACTION, BILATERAL  2020   • VASECTOMY         Social History:    Social History      Socioeconomic History   • Marital status:      Spouse name: Not on file   • Number of children: Not on file   • Years of education: Not on file   • Highest education level: Not on file   Tobacco Use   • Smoking status: Never Smoker   • Smokeless tobacco: Never Used   Substance and Sexual Activity   • Alcohol use: No   • Drug use: No   • Sexual activity: Defer       Family History:    Family History   Problem Relation Age of Onset   • Cancer Mother    • Hyperlipidemia Mother    • Kidney disease Mother    • Stroke Mother    • Cancer Sister    • Cancer Brother        Allergies:      Patient has no known allergies.    Home Medications:    Prior to Admission Medications     Prescriptions Last Dose Informant Patient Reported? Taking?    bisacodyl (DULCOLAX) 5 MG EC tablet 1/12/2020  No Yes    Take 2 @ 3pm, 2 @ 7 pm day prior to colonoscopy    latanoprost (XALATAN) 0.005 % ophthalmic solution Past Week Self Yes Yes    Administer 1 drop to both eyes Daily.    propranolol (INDERAL) 40 MG tablet Past Week Self No Yes    Take 1 tablet by mouth 3 (Three) Times a Day As Needed (tremors).    propranolol LA (INDERAL LA) 60 MG 24 hr capsule Past Week Self No Yes    Take 1 capsule by mouth Daily.             ED Medications:    Medications   sodium chloride 0.9 % flush 10 mL (has no administration in time range)   lactated ringers infusion 1,000 mL (1,000 mL Intravenous New Bag 1/13/20 0630)       Vital Signs:    Temp:  [97.6 °F (36.4 °C)] 97.6 °F (36.4 °C)  Heart Rate:  [107] 107  Resp:  [16] 16  BP: (158)/(93) 158/93        01/02/20  1035 01/13/20  0629   Weight: 95.3 kg (210 lb) 93.1 kg (205 lb 4 oz)       Body mass index is 32.15 kg/m².    Physical Exam:      General Appearance:  Alert and cooperative, not in any acute distress.   Head:  Atraumatic and normocephalic, without obvious abnormality.   Eyes:          PERRLA, conjunctivae and sclerae normal, no Icterus. No pallor. Extraocular movements are within normal  limits.   Ears:  Ears appear intact with no abnormalities noted.   Throat: No oral lesions, no thrush, oral mucosa moist.   Neck: Supple, trachea midline, no thyromegaly, no carotid bruit.       Respiratory/Lungs:   Breath sounds heard bilaterally equally.  No crackles or wheezing. No Pleural rub or bronchial breathing. Normal respiratory effort.    Cardiovascular/Heart:  Normal S1 and S2, no murmur. No edema   GI/Abdomen:   No masses, no hepatosplenomegaly. Soft, non-tender, non-distended, no hernia                 Musculoskeletal/ Extremities:   Moves all extremities well   Pulses: Pulses palpable and equal bilaterally   Skin: No bleeding, bruising or rash, no induration   Lymph nodes: No palpable adenopathy   Psychiatric : Alert and oriented ×3.  No depression or anxiety            EKG:      None    Labs:    Lab Results (last 24 hours)     ** No results found for the last 24 hours. **          Radiology:    Imaging Results (Last 72 Hours)     ** No results found for the last 72 hours. **          Assessment:    Colon cancer screening, average risk    Plan:     Colonoscopy today, risk of bleeding perforation discussed and patient agreeable    Cindy Terry MD  01/13/20  7:15 AM

## 2020-01-13 NOTE — ANESTHESIA PREPROCEDURE EVALUATION
Anesthesia Evaluation     Patient summary reviewed and Nursing notes reviewed   no history of anesthetic complications:  NPO Solid Status: > 8 hours  NPO Liquid Status: > 8 hours           Airway   Mallampati: II  TM distance: >3 FB  Neck ROM: full  No difficulty expected  Dental - normal exam     Pulmonary - negative pulmonary ROS and normal exam   (-) not a smoker  Cardiovascular - negative cardio ROS and normal exam  Exercise tolerance: good (4-7 METS)    Patient on routine beta blocker and Beta blocker not taken-may be given intraoperatively        Neuro/Psych  (+) tremors,       ROS Comment: Hx of essential tremor  GI/Hepatic/Renal/Endo    (+)   renal disease stones,     Musculoskeletal (-) negative ROS    Abdominal  - normal exam   Substance History - negative use  (-) alcohol use, drug use     OB/GYN negative ob/gyn ROS         Other        ROS/Med Hx Other: cataracts                  Anesthesia Plan    ASA 2     MAC   (Patient advised that intravenous sedation would be utilized as primary anesthetic technique. Every effort will be made to make sure patient is comfortable. Patient advised that they may experience recall of events of the procedure. Patient verbalized understanding and agreed to plan. )  intravenous induction     Anesthetic plan, all risks, benefits, and alternatives have been provided, discussed and informed consent has been obtained with: patient.    Plan discussed with CRNA.

## 2020-01-13 NOTE — ANESTHESIA POSTPROCEDURE EVALUATION
Patient: Adonay Rivas    Procedure Summary     Date:  01/13/20 Room / Location:  [facility] ENDOSCOPY 1 / [facility] ENDOSCOPY    Anesthesia Start:  0717 Anesthesia Stop:  0746    Procedure:  COLONOSCOPY with hot biopsy polypectomies (N/A ) Diagnosis:       Colon cancer screening      (Colon cancer screening [Z12.11])    Surgeon:  Cindy Terry MD Provider:  Jakob Gilbert CRNA    Anesthesia Type:  MAC ASA Status:  2          Anesthesia Type: MAC    Vitals  Vitals Value Taken Time   /64 1/13/2020  7:42 AM   Temp 98.2 °F (36.8 °C) 1/13/2020  7:42 AM   Pulse 73 1/13/2020  7:42 AM   Resp 18 1/13/2020  7:42 AM   SpO2 94 % 1/13/2020  7:42 AM           Post Anesthesia Care and Evaluation    Patient location during evaluation: bedside  Patient participation: complete - patient participated  Level of consciousness: awake  Pain score: 0  Pain management: adequate  Airway patency: patent  Anesthetic complications: No anesthetic complications  PONV Status: controlled  Cardiovascular status: acceptable and stable  Respiratory status: acceptable and room air  Hydration status: acceptable

## 2020-01-13 NOTE — DISCHARGE INSTRUCTIONS
Please follow all post op instructions and follow up appointment time from your physician's office included in your discharge packet.    No pushing, pulling, tugging, heavy lifting, or strenuous activity.  No major decision making, driving, or drinking alcoholic beverages for 24 hours. (due to the medications you have received)  Always use good hand hygiene/washing techniques.  NO driving while taking pain medications.    To assist you in voiding:  Drink plenty of fluids  Listen to running water while attempting to void.    If you are unable to urinate and you have an uncomfortable urge to void or it has been   6 hours since you were discharged, return to the Emergency Room

## 2020-01-14 LAB
LAB AP CASE REPORT: NORMAL
PATH REPORT.FINAL DX SPEC: NORMAL

## 2020-02-06 ENCOUNTER — HOSPITAL ENCOUNTER (OUTPATIENT)
Dept: CT IMAGING | Facility: HOSPITAL | Age: 70
Discharge: HOME | End: 2020-02-06
Admitting: PHYSICIAN ASSISTANT

## 2020-02-06 DIAGNOSIS — K76.89 HEPATIC CYST: ICD-10-CM

## 2020-02-06 DIAGNOSIS — N28.1 RENAL CYST: ICD-10-CM

## 2020-02-06 PROCEDURE — 74176 CT ABD & PELVIS W/O CONTRAST: CPT

## 2020-02-07 DIAGNOSIS — N20.0 BILATERAL NEPHROLITHIASIS: ICD-10-CM

## 2020-02-07 DIAGNOSIS — R10.9 RIGHT FLANK PAIN: Primary | ICD-10-CM

## 2020-02-24 ENCOUNTER — OFFICE VISIT (OUTPATIENT)
Dept: UROLOGY | Facility: CLINIC | Age: 70
End: 2020-02-24

## 2020-02-24 VITALS
OXYGEN SATURATION: 93 % | BODY MASS INDEX: 32.96 KG/M2 | HEART RATE: 51 BPM | TEMPERATURE: 97.7 F | HEIGHT: 67 IN | WEIGHT: 210 LBS

## 2020-02-24 DIAGNOSIS — N20.0 NEPHROLITHIASIS: ICD-10-CM

## 2020-02-24 DIAGNOSIS — R10.9 FLANK PAIN: Primary | ICD-10-CM

## 2020-02-24 LAB
BILIRUB BLD-MCNC: NEGATIVE MG/DL
CLARITY, POC: CLEAR
COLOR UR: YELLOW
GLUCOSE UR STRIP-MCNC: NEGATIVE MG/DL
KETONES UR QL: NEGATIVE
LEUKOCYTE EST, POC: NEGATIVE
NITRITE UR-MCNC: NEGATIVE MG/ML
PH UR: 5.5 [PH] (ref 5–8)
PROT UR STRIP-MCNC: NEGATIVE MG/DL
RBC # UR STRIP: NEGATIVE /UL
SP GR UR: 1.03 (ref 1–1.03)
UROBILINOGEN UR QL: NORMAL

## 2020-02-24 PROCEDURE — 51798 US URINE CAPACITY MEASURE: CPT | Performed by: UROLOGY

## 2020-02-24 PROCEDURE — 99204 OFFICE O/P NEW MOD 45 MIN: CPT | Performed by: UROLOGY

## 2020-02-24 PROCEDURE — 81003 URINALYSIS AUTO W/O SCOPE: CPT | Performed by: UROLOGY

## 2020-02-24 NOTE — PROGRESS NOTES
Chief Complaint  Kidney Stone    HPI  Mr. Rivas is a 69 y.o. male who presents for further management of nephrolithiasis.    He presented to his PCP and was diagnosed with bilateral lower pole renal stones, with the largest volume on the left side at about 9 mm in size.  He presents today for follow up.  He is having intermittent, positional R lower back pain, which is exacerbated by bending over and getting up quickly.  No fever, chills, nausea, vomiting.  No dysuria.    Large stone in 2007 he says, removed by U[facility].     Stone related history:  Family history of kidney stones  yes  Renal disease or anatomic abnormality: no  Malabsorptive disease or gastric bypass: no  Frequent UTI's    no  Parathyroid disease    no    Dietary Considerations  Soda - 0 per day  Fast food - 1 per week  Water - 6 glasses per day  no Adds salt to foods    IPSS Questionnaire (AUA-7):  Over the past month…    1)  Incomplete Emptying  How often have you had a sensation of not emptying your bladder?  0 - Not at all   2)  Frequency  How often have you had to urinate less than every two hours? 2 - Less than half the time   3)  Intermittency  How often have you found you stopped and started again several times when you urinated?  0 - Not at all   4) Urgency  How often have you found it difficult to postpone urination?  3 - About half the time   5) Weak Stream  How often have you had a weak urinary stream?  0 - Not at all   6) Straining  How often have you had to push or strain to begin urination?  0 - Not at all   7) Nocturia  How many times did you typically get up at night to urinate?  1 - 1 time   Total Score:  6       Quality of life due to urinary symptoms:  If you were to spend the rest of your life with your urinary condition the way it is now, how would you feel about that? 3-Mixed   Urine Leakage (Incontinence) 0-No Leakage         Past Medical History  Past Medical History:   Diagnosis Date   • Cataract    • Kidney stone    •  Tremors of nervous system     essential        Past Surgical History  Past Surgical History:   Procedure Laterality Date   • CATARACT EXTRACTION, BILATERAL  01/02/2020 2018   • COLONOSCOPY N/A 1/13/2020    Procedure: COLONOSCOPY with hot biopsy polypectomies;  Surgeon: Cindy Terry MD;  Location: [facility] ENDOSCOPY;  Service: Gastroenterology   • VASECTOMY         Medications    Current Outpatient Medications:   •  bisacodyl (DULCOLAX) 5 MG EC tablet, Take 2 @ 3pm, 2 @ 7 pm day prior to colonoscopy, Disp: 4 tablet, Rfl: 0  •  latanoprost (XALATAN) 0.005 % ophthalmic solution, Administer 1 drop to both eyes Daily., Disp: , Rfl: 3  •  propranolol (INDERAL) 40 MG tablet, Take 1 tablet by mouth 3 (Three) Times a Day As Needed (tremors)., Disp: 60 tablet, Rfl: 2  •  propranolol LA (INDERAL LA) 60 MG 24 hr capsule, Take 1 capsule by mouth Daily., Disp: 90 capsule, Rfl: 3    Allergies  No Known Allergies    Social History  Social History     Socioeconomic History   • Marital status:      Spouse name: Not on file   • Number of children: Not on file   • Years of education: Not on file   • Highest education level: Not on file   Tobacco Use   • Smoking status: Never Smoker   • Smokeless tobacco: Never Used   Substance and Sexual Activity   • Alcohol use: No   • Drug use: No   • Sexual activity: Defer       Family History  Family History   Problem Relation Age of Onset   • Cancer Mother    • Hyperlipidemia Mother    • Kidney disease Mother    • Stroke Mother    • Cancer Sister    • Cancer Brother        Review of Systems  Constitutional: No fevers or chills  Skin: Negative for rash  Endocrine: No heat/cold intolerance   Cardiovascular: Negative for chest pain or dyspnea on exertion  Respiratory: Negative for shortness of breath or wheezing  Gastrointestinal: No constipation, nausea or vomiting  Genitourinary: No gross hematuria   Musculoskeletal: Negative for low back pain  Neurological:  Negative for frequent  "headaches or dizziness  Lymph/Heme: Negative for leg swelling or calf pain.    Physical Exam  Visit Vitals  Pulse 51   Temp 97.7 °F (36.5 °C)   Ht 170.2 cm (67\")   Wt 95.3 kg (210 lb)   SpO2 93%   BMI 32.89 kg/m²     Constitutional: NAD, WDWN.   HEENT: NCAT. Conjunctivae normal.  MMM.  Endocrine: no clear thyromegaly    Cardiovascular: Regular rate.  Pulmonary/Chest: Respirations are even and non-labored bilaterally.  Back:  no CVA tenderness.  Neurological: A + O x 3.  Cranial Nerves II-XII grossly intact.  Extremities: RENE x 4, Warm. No clubbing.  No cyanosis.    Skin: Pink, warm and dry.  No rashes noted.    Labs  Lab Results   Component Value Date    BUN 11 11/07/2019    CREATININE 0.93 11/07/2019    EGFRIFNONA 81 11/07/2019    EGFRIFAFRI 98 11/07/2019    BCR 11.8 11/07/2019    K 4.7 11/07/2019    CO2 28.2 11/07/2019    CALCIUM 10.1 11/07/2019    PROTENTOTREF 6.5 11/07/2019    ALBUMIN 4.40 11/07/2019    LABIL2 2.1 11/07/2019    AST 18 11/07/2019    ALT 17 11/07/2019       No results found for: WBC, HGB, HCT, MCV, PLT    No results found for: LDH, URICACID    Lab Results   Component Value Date    CALCIUM 10.1 11/07/2019       Brief Urine Lab Results  (Last result in the past 365 days)      Color   Clarity   Blood   Leuk Est   Nitrite   Protein   CREAT   Urine HCG        02/24/20 0841 Yellow Clear Negative Negative Negative Negative             Lab Results   Component Value Date    PSA 0.639 10/05/2017       No results found for: URINECX    )No components found for: STONEANALYSI      Radiologic Studies  Ct Abdomen Pelvis Without Contrast    Result Date: 2/6/2020  Impression: 1. Renal and hepatic cysts which are unchanged. 2. Nonobstructing renal stones with no hydronephrosis or ureterolithiasis. 3. Colonic diverticula without CT evidence of diverticulitis. 4. Large fat-containing right inguinal hernia.     1276.31 mGy.cm. 23.70 mGy  This study was performed with techniques to keep radiation doses as low as " reasonably achievable (ALARA). Individualized dose reduction techniques using automated exposure control or adjustment of mA and/or kV according to the patient size were employed.  This report was finalized on 2/6/2020 8:38 AM by Donavon Quinn M.D..    I have personally reviewed these labs and images.    PVR  Post-void residual performed with ultrasound scanner by staff and interpreted by me - 0      Assessment  Mr. Rivas is a 69 y.o. male with bilateral kidney stones and chronic intermittent right low back pain, which is likely MSK.  He has lower urinary tract symptoms, thickened bladder wall and enlarged prostate on CT, but is overall not bothered.    We had informed discussion about the causes of stones, in the main treatments for nephrolithiasis in 2019.  The 3 main surgical treatments for stones are percutaneous nephrolithotomy, ureteroscopy and laser lithotripsy, and shockwave lithotripsy.  Based on the stone size and location, I have recommended treatment to prevent obstruction in the future, but the patient would like to observe his stones for now.  We discussed the risks, benefits, and alternatives to this strategy.  He voiced his understanding of the risks.    Plan  1.  FU in 1 yr w/ KUB  2.  Recommend referral to physical therapy or evaluation for lumbar disc disease  3.  Recommend yearly or every other year PSA screening at the discretion of his PCP

## 2021-01-01 ENCOUNTER — HOSPITAL ENCOUNTER (EMERGENCY)
Facility: HOSPITAL | Age: 71
Discharge: HOME | End: 2021-01-01
Attending: EMERGENCY MEDICINE | Admitting: EMERGENCY MEDICINE

## 2021-01-01 ENCOUNTER — APPOINTMENT (OUTPATIENT)
Dept: ULTRASOUND IMAGING | Facility: HOSPITAL | Age: 71
End: 2021-01-01

## 2021-01-01 VITALS
HEART RATE: 71 BPM | BODY MASS INDEX: 33.43 KG/M2 | DIASTOLIC BLOOD PRESSURE: 85 MMHG | SYSTOLIC BLOOD PRESSURE: 141 MMHG | OXYGEN SATURATION: 100 % | RESPIRATION RATE: 16 BRPM | TEMPERATURE: 98.6 F | WEIGHT: 213 LBS | HEIGHT: 67 IN

## 2021-01-01 DIAGNOSIS — M79.89 PAIN AND SWELLING OF LEFT LOWER LEG: Primary | ICD-10-CM

## 2021-01-01 DIAGNOSIS — M79.662 PAIN AND SWELLING OF LEFT LOWER LEG: Primary | ICD-10-CM

## 2021-01-01 PROCEDURE — 93971 EXTREMITY STUDY: CPT

## 2021-01-01 PROCEDURE — 99282 EMERGENCY DEPT VISIT SF MDM: CPT

## 2021-01-01 RX ORDER — HYDROCODONE BITARTRATE AND ACETAMINOPHEN 5; 325 MG/1; MG/1
1 TABLET ORAL EVERY 6 HOURS PRN
Qty: 10 TABLET | Refills: 0 | Status: SHIPPED | OUTPATIENT
Start: 2021-01-01 | End: 2021-02-25

## 2021-01-01 RX ORDER — HYDROCODONE BITARTRATE AND ACETAMINOPHEN 5; 325 MG/1; MG/1
1 TABLET ORAL ONCE
Status: DISCONTINUED | OUTPATIENT
Start: 2021-01-01 | End: 2021-01-01 | Stop reason: HOSPADM

## 2021-01-01 NOTE — ED PROVIDER NOTES
Subjective   70-year-old male presenting with leg pain and swelling.  CHF last 2 weeks he has had increasing swelling in his left lower extremity.  Also has some discomfort in the leg, mostly around the ankle and posterior distal lower leg.  Worse with certain movements and bearing weight.  No alleviating factors.  He denies any injury.  No fevers, chills, nausea, vomiting, numbness, weakness.  He does note history of varicose veins.  He was seen in urgent care and sent here for ultrasound to rule out DVT.          Review of Systems   Constitutional: Negative.    HENT: Negative.    Eyes: Negative.    Respiratory: Negative.    Cardiovascular: Positive for leg swelling. Negative for chest pain and palpitations.   Gastrointestinal: Negative.    Genitourinary: Negative.    Musculoskeletal: Positive for myalgias.   Skin: Negative.    Neurological: Negative.    Psychiatric/Behavioral: Negative.        Past Medical History:   Diagnosis Date   • Cataract    • Kidney stone    • Tremors of nervous system     essential        No Known Allergies    Past Surgical History:   Procedure Laterality Date   • CATARACT EXTRACTION, BILATERAL  01/02/2020 2018   • COLONOSCOPY N/A 1/13/2020    Procedure: COLONOSCOPY with hot biopsy polypectomies;  Surgeon: Cindy Terry MD;  Location: [facility] ENDOSCOPY;  Service: Gastroenterology   • VASECTOMY         Family History   Problem Relation Age of Onset   • Cancer Mother    • Hyperlipidemia Mother    • Kidney disease Mother    • Stroke Mother    • Cancer Sister    • Cancer Brother        Social History     Socioeconomic History   • Marital status:      Spouse name: Not on file   • Number of children: Not on file   • Years of education: Not on file   • Highest education level: Not on file   Tobacco Use   • Smoking status: Never Smoker   • Smokeless tobacco: Never Used   Substance and Sexual Activity   • Alcohol use: No   • Drug use: No   • Sexual activity: Defer           Objective    Physical Exam  Vitals signs reviewed.   Constitutional:       General: He is not in acute distress.     Appearance: Normal appearance. He is not ill-appearing, toxic-appearing or diaphoretic.   HENT:      Head: Normocephalic and atraumatic.      Right Ear: External ear normal.      Left Ear: External ear normal.      Nose: Nose normal.      Mouth/Throat:      Mouth: Mucous membranes are moist.      Pharynx: Oropharynx is clear.   Eyes:      Extraocular Movements: Extraocular movements intact.      Conjunctiva/sclera: Conjunctivae normal.      Pupils: Pupils are equal, round, and reactive to light.   Neck:      Musculoskeletal: Normal range of motion and neck supple.   Cardiovascular:      Rate and Rhythm: Normal rate and regular rhythm.      Pulses: Normal pulses.      Heart sounds: Normal heart sounds.      Comments: 2+ distal pulses, numerous varicose veins none of which are particularly tender  Pulmonary:      Effort: Pulmonary effort is normal. No respiratory distress.      Breath sounds: Normal breath sounds.   Abdominal:      General: Bowel sounds are normal. There is no distension.      Tenderness: There is no abdominal tenderness.   Musculoskeletal: Normal range of motion.         General: No deformity.      Comments: Mild swelling of the left lower extremity, mild tenderness distal lower leg, no calf tenderness   Skin:     General: Skin is warm and dry.      Capillary Refill: Capillary refill takes less than 2 seconds.      Findings: No rash.   Neurological:      General: No focal deficit present.      Mental Status: He is alert and oriented to person, place, and time.      Comments: Normal strength and sensation, gait normal   Psychiatric:         Mood and Affect: Mood normal.         Behavior: Behavior normal.         Procedures           ED Course                                           MDM  Number of Diagnoses or Management Options  Pain and swelling of left lower leg:   Diagnosis management  comments: 70-year-old male with leg pain and swelling.  Well-developed, well-nourished man in no distress with exam as above.  He is neurovascular intact.  Does have some swelling and tenderness to the left lower extremity.  Will obtain ultrasound and treat symptomatically.  Disposition pending.    DDx: Varicose veins, lymphedema, DVT    Ultrasound is negative for acute findings.  Will discharge home with supportive measures and outpatient follow-up.       Amount and/or Complexity of Data Reviewed  Tests in the radiology section of CPT®: reviewed        Final diagnoses:   Pain and swelling of left lower leg            Tom Gill MD  01/01/21 2435

## 2021-01-05 ENCOUNTER — OFFICE VISIT (OUTPATIENT)
Dept: INTERNAL MEDICINE | Facility: CLINIC | Age: 71
End: 2021-01-05

## 2021-01-05 VITALS
WEIGHT: 211 LBS | BODY MASS INDEX: 33.12 KG/M2 | HEIGHT: 67 IN | DIASTOLIC BLOOD PRESSURE: 80 MMHG | TEMPERATURE: 97.1 F | HEART RATE: 91 BPM | OXYGEN SATURATION: 98 % | SYSTOLIC BLOOD PRESSURE: 132 MMHG

## 2021-01-05 DIAGNOSIS — M25.572 ACUTE LEFT ANKLE PAIN: Primary | ICD-10-CM

## 2021-01-05 LAB
ALBUMIN SERPL-MCNC: 4.3 G/DL (ref 3.5–5.2)
ALBUMIN/GLOB SERPL: 1.8 G/DL
ALP SERPL-CCNC: 104 U/L (ref 39–117)
ALT SERPL-CCNC: 17 U/L (ref 1–41)
AST SERPL-CCNC: 15 U/L (ref 1–40)
BILIRUB SERPL-MCNC: 0.9 MG/DL (ref 0–1.2)
BUN SERPL-MCNC: 17 MG/DL (ref 8–23)
BUN/CREAT SERPL: 18.3 (ref 7–25)
CALCIUM SERPL-MCNC: 10.8 MG/DL (ref 8.6–10.5)
CHLORIDE SERPL-SCNC: 105 MMOL/L (ref 98–107)
CO2 SERPL-SCNC: 29.7 MMOL/L (ref 22–29)
CREAT SERPL-MCNC: 0.93 MG/DL (ref 0.76–1.27)
ERYTHROCYTE [DISTWIDTH] IN BLOOD BY AUTOMATED COUNT: 12.5 % (ref 12.3–15.4)
ERYTHROCYTE [SEDIMENTATION RATE] IN BLOOD BY WESTERGREN METHOD: 1 MM/HR (ref 0–20)
GLOBULIN SER CALC-MCNC: 2.4 GM/DL
GLUCOSE SERPL-MCNC: 98 MG/DL (ref 65–99)
HCT VFR BLD AUTO: 46 % (ref 37.5–51)
HGB BLD-MCNC: 15.6 G/DL (ref 13–17.7)
MCH RBC QN AUTO: 30.8 PG (ref 26.6–33)
MCHC RBC AUTO-ENTMCNC: 33.9 G/DL (ref 31.5–35.7)
MCV RBC AUTO: 90.9 FL (ref 79–97)
PLATELET # BLD AUTO: 202 10*3/MM3 (ref 140–450)
POTASSIUM SERPL-SCNC: 5.1 MMOL/L (ref 3.5–5.2)
PROT SERPL-MCNC: 6.7 G/DL (ref 6–8.5)
RBC # BLD AUTO: 5.06 10*6/MM3 (ref 4.14–5.8)
SODIUM SERPL-SCNC: 141 MMOL/L (ref 136–145)
URATE SERPL-MCNC: 4.2 MG/DL (ref 3.4–7)
WBC # BLD AUTO: 5.26 10*3/MM3 (ref 3.4–10.8)

## 2021-01-05 PROCEDURE — 99214 OFFICE O/P EST MOD 30 MIN: CPT | Performed by: PHYSICIAN ASSISTANT

## 2021-01-05 RX ORDER — METHYLPREDNISOLONE 4 MG/1
TABLET ORAL
Qty: 1 EACH | Refills: 0 | Status: SHIPPED | OUTPATIENT
Start: 2021-01-05 | End: 2021-02-25

## 2021-01-05 NOTE — PROGRESS NOTES
"Chief Complaint  Follow-up (ED for Pain and swelling of left lower leg, having L ankle pain)    Subjective          Adonay Rivas presents to [facility] for   History of Present Illness  He was seen at [facility] ED on 1/1/21 due to left lower leg pain and edema at which time DVT was ruled out. Today he complains of left ankle pain for around 2 weeks in total. Symptoms began 2-3 days after he spent a couple of nights sleeping on a small couch with both ankles resting on the arm of the couch. He was initially taking Motrin which helped but then was prescribed Norco in the ED which he has been taking at night without any notable pain control. Pain is most bothersome in the anterior and posterior left ankle and is much more severe at night. Last 4 nights has slept with legs elevated. Pain med is not helpful. No chest pain or SOA. Little increase swelling in bilateral lower legs for the last 2 weeks but not substantial. He has been wearing compression stockings for the last couple of days but is unsure if there has been any improvement. Increased activity helps pain at the time but does not worsen or improve nighttime symptoms. No abnormal erythema or warmth. No previous similar occurrences.         Objective   Vital Signs:   /80   Pulse 91   Temp 97.1 °F (36.2 °C)   Ht 170.2 cm (67.01\")   Wt 95.7 kg (211 lb)   SpO2 98%   BMI 33.04 kg/m²     Physical Exam  Vitals signs and nursing note reviewed.   Constitutional:       General: He is not in acute distress.     Appearance: He is well-developed. He is obese. He is not ill-appearing, toxic-appearing or diaphoretic.   HENT:      Head: Normocephalic and atraumatic.      Right Ear: External ear normal.      Left Ear: External ear normal.   Eyes:      General: No scleral icterus.     Extraocular Movements: Extraocular movements intact.      Conjunctiva/sclera: Conjunctivae normal.      Pupils: Pupils are equal, round, and reactive to " light.   Neck:      Musculoskeletal: Normal range of motion and neck supple. No muscular tenderness.   Cardiovascular:      Rate and Rhythm: Normal rate and regular rhythm.      Heart sounds: Normal heart sounds. No murmur. No friction rub. No gallop.       Comments: Extensive varicosities of bilateral lower legs including feet, left worse than right. No particularly tender or swollen varicosities.   Pulmonary:      Effort: Pulmonary effort is normal. No respiratory distress.      Breath sounds: Normal breath sounds. No wheezing, rhonchi or rales.   Chest:      Chest wall: No tenderness.   Abdominal:      General: Bowel sounds are normal.      Palpations: Abdomen is soft.      Tenderness: There is no abdominal tenderness. There is no right CVA tenderness or left CVA tenderness.   Musculoskeletal:         General: No tenderness or deformity.      Left ankle: He exhibits decreased range of motion (pain), swelling (trace, non-pitting) and abnormal pulse (decreased). He exhibits no ecchymosis, no deformity and no laceration. No tenderness (no tenderness to palpation but pain present with flexion and extension of the ankle). No lateral malleolus, no medial malleolus, no AITFL, no CF ligament, no posterior TFL, no head of 5th metatarsal and no proximal fibula tenderness found. Achilles tendon exhibits no pain, no defect and normal Woods's test results.      Right lower leg: He exhibits no tenderness ( negative Homans sign) and no bony tenderness. Edema (trace, non-pitting) present.      Left lower leg: He exhibits no tenderness (negative Homans sign) and no bony tenderness. Edema ( trace, non-pitting) present.      Right foot: Normal range of motion and normal capillary refill. No tenderness, bony tenderness, swelling, crepitus, deformity or laceration.      Left foot: Normal range of motion and normal capillary refill. No tenderness, bony tenderness, swelling, crepitus, deformity or laceration.   Lymphadenopathy:       Cervical: No cervical adenopathy.   Skin:     General: Skin is warm and dry.      Capillary Refill: Capillary refill takes less than 2 seconds.      Findings: No rash.   Neurological:      General: No focal deficit present.      Mental Status: He is alert and oriented to person, place, and time.      Cranial Nerves: Cranial nerves are intact. No cranial nerve deficit, dysarthria or facial asymmetry.      Sensory: No sensory deficit.      Motor: Tremor (hands) present. No abnormal muscle tone.      Coordination: Coordination is intact. Romberg sign negative. Coordination normal.      Gait: Gait is intact. Gait normal.      Deep Tendon Reflexes: Babinski sign absent on the right side. Babinski sign absent on the left side.      Reflex Scores:       Patellar reflexes are 1+ on the right side and 1+ on the left side.       Achilles reflexes are 2+ on the right side and 2+ on the left side.  Psychiatric:         Mood and Affect: Mood normal.         Behavior: Behavior normal.         Thought Content: Thought content normal.         Judgment: Judgment normal.        Result Review :   The following data was reviewed by: RACHEL Miller on 01/05/2021:  Data reviewed: Radiologic studies ultrasound left lower extremity (duplex)- unremarkable   ER record reviewed         Assessment and Plan    Problem List Items Addressed This Visit     None      Visit Diagnoses     Acute left ankle pain    -  Primary    Relevant Medications    Begin: methylPREDNISolone (MEDROL) 4 MG dose pack    Discontinue Norco due to lack of efficacy in pain control. Begin use of Motrin tid prn with food. Wear compression stockings daily.       Other Relevant Orders    Comprehensive Metabolic Panel    CBC (No Diff)    Sedimentation Rate    Uric acid        I spent 30 minutes caring for Adonay on this date of service. This time includes time spent by me in the following activities:preparing for the visit, reviewing tests, obtaining and/or reviewing  a separately obtained history, performing a medically appropriate examination and/or evaluation , ordering medications, tests, or procedures and documenting information in the medical record  Follow Up   No follow-ups on file.  Patient was given instructions and counseling regarding his condition or for health maintenance advice. Please see specific information pulled into the AVS if appropriate.

## 2021-01-17 ENCOUNTER — APPOINTMENT (OUTPATIENT)
Dept: CT IMAGING | Facility: HOSPITAL | Age: 71
End: 2021-01-17

## 2021-01-17 ENCOUNTER — HOSPITAL ENCOUNTER (EMERGENCY)
Facility: HOSPITAL | Age: 71
Discharge: HOME | End: 2021-01-17
Attending: EMERGENCY MEDICINE | Admitting: EMERGENCY MEDICINE

## 2021-01-17 VITALS
TEMPERATURE: 97.9 F | HEIGHT: 67 IN | HEART RATE: 84 BPM | RESPIRATION RATE: 20 BRPM | DIASTOLIC BLOOD PRESSURE: 59 MMHG | WEIGHT: 200 LBS | OXYGEN SATURATION: 96 % | BODY MASS INDEX: 31.39 KG/M2 | SYSTOLIC BLOOD PRESSURE: 126 MMHG

## 2021-01-17 DIAGNOSIS — R31.9 URINARY TRACT INFECTION WITH HEMATURIA, SITE UNSPECIFIED: ICD-10-CM

## 2021-01-17 DIAGNOSIS — N23 RENAL COLIC ON LEFT SIDE: Primary | ICD-10-CM

## 2021-01-17 DIAGNOSIS — N39.0 URINARY TRACT INFECTION WITH HEMATURIA, SITE UNSPECIFIED: ICD-10-CM

## 2021-01-17 DIAGNOSIS — N20.1 URETERAL STONE: ICD-10-CM

## 2021-01-17 LAB
BACTERIA UR QL AUTO: ABNORMAL /HPF
BILIRUB UR QL STRIP: NEGATIVE
CLARITY UR: ABNORMAL
COD CRY URNS QL: ABNORMAL /HPF
COLOR UR: YELLOW
GLUCOSE UR STRIP-MCNC: ABNORMAL MG/DL
HGB UR QL STRIP.AUTO: ABNORMAL
HYALINE CASTS UR QL AUTO: ABNORMAL /LPF
KETONES UR QL STRIP: ABNORMAL
LEUKOCYTE ESTERASE UR QL STRIP.AUTO: ABNORMAL
NITRITE UR QL STRIP: NEGATIVE
PH UR STRIP.AUTO: <=5 [PH] (ref 5–8)
PROT UR QL STRIP: ABNORMAL
RBC # UR: ABNORMAL /HPF
REF LAB TEST METHOD: ABNORMAL
SP GR UR STRIP: >=1.03 (ref 1–1.03)
SQUAMOUS #/AREA URNS HPF: ABNORMAL /HPF
UROBILINOGEN UR QL STRIP: ABNORMAL
WBC UR QL AUTO: ABNORMAL /HPF

## 2021-01-17 PROCEDURE — 63710000001 ONDANSETRON ODT 4 MG TABLET DISPERSIBLE: Performed by: EMERGENCY MEDICINE

## 2021-01-17 PROCEDURE — 74176 CT ABD & PELVIS W/O CONTRAST: CPT

## 2021-01-17 PROCEDURE — 81001 URINALYSIS AUTO W/SCOPE: CPT | Performed by: EMERGENCY MEDICINE

## 2021-01-17 PROCEDURE — 99283 EMERGENCY DEPT VISIT LOW MDM: CPT

## 2021-01-17 RX ORDER — CEFDINIR 300 MG/1
300 CAPSULE ORAL 2 TIMES DAILY
Qty: 14 CAPSULE | Refills: 0 | Status: SHIPPED | OUTPATIENT
Start: 2021-01-17 | End: 2021-02-25

## 2021-01-17 RX ORDER — HYDROCODONE BITARTRATE AND ACETAMINOPHEN 10; 325 MG/1; MG/1
1 TABLET ORAL ONCE
Status: COMPLETED | OUTPATIENT
Start: 2021-01-17 | End: 2021-01-17

## 2021-01-17 RX ORDER — HYDROCODONE BITARTRATE AND ACETAMINOPHEN 5; 325 MG/1; MG/1
1 TABLET ORAL EVERY 6 HOURS PRN
Qty: 12 TABLET | Refills: 0 | Status: SHIPPED | OUTPATIENT
Start: 2021-01-17 | End: 2021-02-25

## 2021-01-17 RX ORDER — ONDANSETRON 4 MG/1
4 TABLET, ORALLY DISINTEGRATING ORAL EVERY 8 HOURS PRN
Qty: 12 TABLET | Refills: 0 | Status: SHIPPED | OUTPATIENT
Start: 2021-01-17 | End: 2021-02-25

## 2021-01-17 RX ORDER — TAMSULOSIN HYDROCHLORIDE 0.4 MG/1
1 CAPSULE ORAL NIGHTLY
Qty: 10 CAPSULE | Refills: 0 | Status: SHIPPED | OUTPATIENT
Start: 2021-01-17 | End: 2021-02-25

## 2021-01-17 RX ORDER — TAMSULOSIN HYDROCHLORIDE 0.4 MG/1
0.4 CAPSULE ORAL ONCE
Status: COMPLETED | OUTPATIENT
Start: 2021-01-17 | End: 2021-01-17

## 2021-01-17 RX ORDER — CEFDINIR 300 MG/1
300 CAPSULE ORAL ONCE
Status: COMPLETED | OUTPATIENT
Start: 2021-01-17 | End: 2021-01-17

## 2021-01-17 RX ORDER — ONDANSETRON 4 MG/1
4 TABLET, ORALLY DISINTEGRATING ORAL ONCE
Status: COMPLETED | OUTPATIENT
Start: 2021-01-17 | End: 2021-01-17

## 2021-01-17 RX ADMIN — TAMSULOSIN HYDROCHLORIDE 0.4 MG: 0.4 CAPSULE ORAL at 21:40

## 2021-01-17 RX ADMIN — CEFDINIR 300 MG: 300 CAPSULE ORAL at 21:40

## 2021-01-17 RX ADMIN — ONDANSETRON 4 MG: 4 TABLET, ORALLY DISINTEGRATING ORAL at 21:40

## 2021-01-17 RX ADMIN — HYDROCODONE BITARTRATE AND ACETAMINOPHEN 1 TABLET: 10; 325 TABLET ORAL at 21:40

## 2021-01-18 NOTE — ED PROVIDER NOTES
Subjective   History of Present Illness    Chief Complaint: Left lower quadrant pain  History of Present Illness: 70-year-old male with a history of kidney stones, presents with left lower quadrant pain.  Had evaluation by urology approximately a year ago, advised he was told he had a large left renal stone and multiple bilateral smaller stones.  Not had any trouble until today when he noted sharp left lower quadrant pain which is largely resolved prior to arrival here  Onset: Today  Duration: Improved  Exacerbating / Alleviating factors: None  Associated symptoms: None      Nurses Notes reviewed and agree, including vitals, allergies, social history and prior medical history.     REVIEW OF SYSTEMS: All systems reviewed and not pertinent unless noted.    Positive for: Sharp left lower quadrant pain with a history of kidney stones    Negative for: GI bleeding diarrhea constipation abdominal distention flank pain hematuria fever cough chills  Review of Systems    Past Medical History:   Diagnosis Date   • Cataract    • Kidney stone    • Tremors of nervous system     essential        No Known Allergies    Past Surgical History:   Procedure Laterality Date   • CATARACT EXTRACTION, BILATERAL  01/02/2020 2018   • COLONOSCOPY N/A 1/13/2020    Procedure: COLONOSCOPY with hot biopsy polypectomies;  Surgeon: Cindy Terry MD;  Location: [facility] ENDOSCOPY;  Service: Gastroenterology   • VASECTOMY         Family History   Problem Relation Age of Onset   • Cancer Mother    • Hyperlipidemia Mother    • Kidney disease Mother    • Stroke Mother    • Cancer Sister    • Cancer Brother        Social History     Socioeconomic History   • Marital status:      Spouse name: Not on file   • Number of children: Not on file   • Years of education: Not on file   • Highest education level: Not on file   Tobacco Use   • Smoking status: Never Smoker   • Smokeless tobacco: Never Used   Substance and Sexual Activity   • Alcohol use: No    • Drug use: No   • Sexual activity: Defer           Objective   Physical Exam    GENERAL APPEARANCE: Well developed, 70-year-old obese white male,  in no acute distress.  VITAL SIGNS: per nursing, reviewed and noted  SKIN: exposed skin with no rashes, ulcerations or petechiae.  Head: Normocephalic, atraumatic.   EYES: perrla. EOMI.  ENT: Normal voice.  Patient maintained wearing a mask throughout patient encounter due to coronavirus pandemic  LUNGS:  No increased work of breathing. No retractions.   CARDIOVASCULAR:  regular rate and rhythm, no murmurs.  Good Peripheral pulses. Good cap refill to extremities.   ABDOMEN: Soft, left lower quadrant tenderness palpation, normal bowel sounds. No hernia. No ascites.  MUSCULOSKELETAL:  No tenderness. Full ROM. Strength and tone normal.  NEUROLOGIC: Alert, oriented x 3. No gross deficits. GCS 15.   NECK: Supple, symmetric. No tenderness, no masses. Full ROM  Back: full rom, no paraspinal spasm. No CVA tenderness.   PSYCH: appropriate affect.  : no bladder tenderness or distention, no CVA tenderness      Procedures     No attending physician procedures were performed on this patient.      ED Course  ED Course as of Jan 18 0634   Sun Jan 17, 2021 1950 Leukocytes, UA(!): Small (1+) [PF]   1950 Protein, UA(!): Trace [PF]   1950 Blood, UA(!): Large (3+) [PF]   1950 Bilirubin, UA: Negative [PF]   1950 Ketones, UA(!): Trace [PF]   1950 Glucose(!): 250 mg/dL (1+) [PF]   1950 Appearance, UA(!): Cloudy [PF]   1952 RBC, UA(!): 31-50 [PF]   1952 WBC, UA(!): 6-12 [PF]   1952 Bacteria, UA(!): 1+ [PF]   1952 Squamous Epithelial Cells, UA: 0-2 [PF]   1952 Hyaline Casts, UA: None Seen [PF]      ED Course User Index  [PF] Sony Wiley,                                            MDM  Work-up reveals obstructing lower ureteral stone with mild hydronephrosis.  Bilateral renal stones.  Work-up also suggestive of mild UTI with 6-12 white cells 1+ bacteria 31-50 red cells patient is  nontoxic normotensive afebrile no tachycardia.  Reviewed old records for which patient had recent labs showing normal renal function and CBC, deferred repeat renal function here.  Will discharge with Norco Zofran Flomax and Omnicef.  Advised outpatient follow return precautions discussed.  Chris reviewed.  Final diagnoses:   Renal colic on left side   Ureteral stone   Urinary tract infection with hematuria, site unspecified            Sony Wiley,   01/18/21 0634

## 2021-02-25 ENCOUNTER — OFFICE VISIT (OUTPATIENT)
Dept: UROLOGY | Facility: CLINIC | Age: 71
End: 2021-02-25

## 2021-02-25 VITALS
TEMPERATURE: 97.3 F | WEIGHT: 200 LBS | HEIGHT: 67 IN | HEART RATE: 95 BPM | BODY MASS INDEX: 31.39 KG/M2 | OXYGEN SATURATION: 98 %

## 2021-02-25 DIAGNOSIS — Z87.442 PERSONAL HISTORY OF KIDNEY STONES: Primary | ICD-10-CM

## 2021-02-25 DIAGNOSIS — N20.0 NEPHROLITHIASIS: ICD-10-CM

## 2021-02-25 LAB
BILIRUB BLD-MCNC: NEGATIVE MG/DL
CLARITY, POC: CLEAR
COLOR UR: YELLOW
GLUCOSE UR STRIP-MCNC: NEGATIVE MG/DL
KETONES UR QL: NEGATIVE
LEUKOCYTE EST, POC: NEGATIVE
NITRITE UR-MCNC: NEGATIVE MG/ML
PH UR: 6 [PH] (ref 5–8)
PROT UR STRIP-MCNC: NEGATIVE MG/DL
RBC # UR STRIP: ABNORMAL /UL
SP GR UR: 1.03 (ref 1–1.03)
UROBILINOGEN UR QL: NORMAL

## 2021-02-25 PROCEDURE — 99214 OFFICE O/P EST MOD 30 MIN: CPT | Performed by: UROLOGY

## 2021-02-25 PROCEDURE — 81003 URINALYSIS AUTO W/O SCOPE: CPT | Performed by: UROLOGY

## 2021-02-25 RX ORDER — TIMOLOL MALEATE 5 MG/ML
1 SOLUTION/ DROPS OPHTHALMIC DAILY
COMMUNITY
Start: 2021-02-04

## 2021-02-25 NOTE — PROGRESS NOTES
Chief Complaint  Kidney Stone    HPI  Mr. Rivas is a 70 y.o. male  who presents for further management of nephrolithiasis.    He recently was in the ER in January and was diagnosed with a 7 mm left distal ureteral stone.  His large 13 mm left lower pole renal stone is still present.  He presents today for follow up.  He is having intermittent discomfort, but no longer any sharp pain.  No fever, chills, nausea, vomiting.  No dysuria.      Past Medical History  Past Medical History:   Diagnosis Date   • Cataract    • Kidney stone    • Tremors of nervous system     essential        Past Surgical History  Past Surgical History:   Procedure Laterality Date   • CATARACT EXTRACTION, BILATERAL  01/02/2020 2018   • COLONOSCOPY N/A 1/13/2020    Procedure: COLONOSCOPY with hot biopsy polypectomies;  Surgeon: Cindy Terry MD;  Location: [facility] ENDOSCOPY;  Service: Gastroenterology   • VASECTOMY         Medications    Current Outpatient Medications:   •  latanoprost (XALATAN) 0.005 % ophthalmic solution, Administer 1 drop to both eyes Daily., Disp: , Rfl: 3  •  propranolol (INDERAL) 40 MG tablet, Take 1 tablet by mouth 3 (Three) Times a Day As Needed (tremors)., Disp: 60 tablet, Rfl: 2  •  propranolol LA (INDERAL LA) 60 MG 24 hr capsule, Take 1 capsule by mouth Daily., Disp: 90 capsule, Rfl: 3  •  timolol (TIMOPTIC) 0.5 % ophthalmic solution, INSTILL 1 DROP IN EACH EYE IN THE MORNING, Disp: , Rfl:     Allergies  No Known Allergies    Social History  Social History     Socioeconomic History   • Marital status:      Spouse name: Not on file   • Number of children: Not on file   • Years of education: Not on file   • Highest education level: Not on file   Tobacco Use   • Smoking status: Never Smoker   • Smokeless tobacco: Never Used   Substance and Sexual Activity   • Alcohol use: No   • Drug use: No   • Sexual activity: Defer       Family History  Family History   Problem Relation Age of Onset   •  "Cancer Mother    • Hyperlipidemia Mother    • Kidney disease Mother    • Stroke Mother    • Cancer Sister    • Cancer Brother        Review of Systems  Constitutional: No fevers or chills  Skin: Negative for rash  Endocrine: No heat/cold intolerance   Cardiovascular: Negative for chest pain or dyspnea on exertion  Respiratory: Negative for shortness of breath or wheezing  Gastrointestinal: No constipation, nausea or vomiting  Genitourinary: No gross hematuria   Musculoskeletal: Negative for low back pain  Neurological:  Negative for frequent headaches or dizziness  Lymph/Heme: Negative for leg swelling or calf pain.    Physical Exam  Visit Vitals  Pulse 95   Temp 97.3 °F (36.3 °C)   Ht 170.2 cm (67\")   Wt 90.7 kg (200 lb)   SpO2 98%   BMI 31.32 kg/m²     Constitutional: NAD, WDWN.   HEENT: NCAT. Conjunctivae normal.  MMM.  Endocrine: no clear thyromegaly    Cardiovascular: Regular rate.  Pulmonary/Chest: Respirations are even and non-labored bilaterally.  Back:  no CVA tenderness.  Neurological: A + O x 3.  Cranial Nerves II-XII grossly intact.  Extremities: RENE x 4, Warm. No clubbing.  No cyanosis.    Skin: Pink, warm and dry.  No rashes noted.    Labs  Lab Results   Component Value Date    BUN 17 01/05/2021    CREATININE 0.93 01/05/2021    EGFRIFNONA 81 01/05/2021    EGFRIFAFRI 98 01/05/2021    BCR 18.3 01/05/2021    K 5.1 01/05/2021    CO2 29.7 (H) 01/05/2021    CALCIUM 10.8 (H) 01/05/2021    PROTENTOTREF 6.7 01/05/2021    ALBUMIN 4.30 01/05/2021    LABIL2 1.8 01/05/2021    AST 15 01/05/2021    ALT 17 01/05/2021       Lab Results   Component Value Date    WBC 5.26 01/05/2021    HGB 15.6 01/05/2021    HCT 46.0 01/05/2021    MCV 90.9 01/05/2021     01/05/2021       Uric Acid   Date Value Ref Range Status   01/05/2021 4.2 3.4 - 7.0 mg/dL Final       Lab Results   Component Value Date    CALCIUM 10.8 (H) 01/05/2021       Brief Urine Lab Results  (Last result in the past 365 days)      Color   Clarity   Blood   " Leuk Est   Nitrite   Protein   CREAT   Urine HCG        02/25/21 0821 Yellow Clear 1+ Negative Negative Negative             Lab Results   Component Value Date    PSA 0.639 10/05/2017       No results found for: URINECX    )No components found for: STONEANALYSI      Radiologic Studies  Ct Abdomen Pelvis Stone Protocol  Result Date: 1/17/2021  4 mm obstructing left ureteral calculus.  Bilateral nonobstructing renal calculi. Authenticated by Rodrigo Gutierrez MD on 01/17/2021 09:18:43 PM    I have personally reviewed these labs and images.    PVR  Post-void residual performed with ultrasound scanner by staff and interpreted by me in the past- 0      Assessment  Mr. Rivas is a 70 y.o. male with bilateral kidney stones , the largest of which is 13 mm in the left lower pole kidney.  He now has a 7 mm left distal ureteral stone.  He is not having any sharp pain, but has had intermittent discomfort and has not seen it pass.    We had informed discussion about the causes of stones, in the main treatments for nephrolithiasis in 2019.  The 3 main surgical treatments for stones are percutaneous nephrolithotomy, ureteroscopy and laser lithotripsy, and shockwave lithotripsy.  Based on the stone size and location, I have recommended treatment for the distal ureteral stone if it is still there on imaging.  We will discuss the other stones in the future.  We discussed the risks, benefits, and alternatives to this strategy.  He voiced his understanding of the risks.    Plan  1.  Repeat CT stone

## 2021-03-03 ENCOUNTER — HOSPITAL ENCOUNTER (OUTPATIENT)
Dept: CT IMAGING | Facility: HOSPITAL | Age: 71
Discharge: HOME | End: 2021-03-03
Admitting: UROLOGY

## 2021-03-03 DIAGNOSIS — N20.0 NEPHROLITHIASIS: ICD-10-CM

## 2021-03-03 DIAGNOSIS — Z87.442 PERSONAL HISTORY OF KIDNEY STONES: ICD-10-CM

## 2021-03-03 PROCEDURE — 74176 CT ABD & PELVIS W/O CONTRAST: CPT

## 2021-03-05 ENCOUNTER — OFFICE VISIT (OUTPATIENT)
Dept: UROLOGY | Facility: CLINIC | Age: 71
End: 2021-03-05

## 2021-03-05 DIAGNOSIS — N20.0 NEPHROLITHIASIS: Primary | ICD-10-CM

## 2021-03-05 PROCEDURE — 99443 PR PHYS/QHP TELEPHONE EVALUATION 21-30 MIN: CPT | Performed by: UROLOGY

## 2021-03-05 RX ORDER — CEFAZOLIN SODIUM 2 G/50ML
2 SOLUTION INTRAVENOUS ONCE
Status: CANCELLED | OUTPATIENT
Start: 2021-04-07 | End: 2021-03-05

## 2021-03-05 RX ORDER — SODIUM CHLORIDE, SODIUM LACTATE, POTASSIUM CHLORIDE, CALCIUM CHLORIDE 600; 310; 30; 20 MG/100ML; MG/100ML; MG/100ML; MG/100ML
100 INJECTION, SOLUTION INTRAVENOUS CONTINUOUS
Status: CANCELLED | OUTPATIENT
Start: 2021-04-07

## 2021-03-05 NOTE — PROGRESS NOTES
Chief Complaint  Kidney Stone    HPI  Mr. Rivas is a 70 y.o. male  who presents for further management of nephrolithiasis.    He recently was in the ER in January and was diagnosed with a 7 mm left distal ureteral stone.  His large 13 mm left lower pole renal stone is still present.  He presents today for follow up.  He is having intermittent discomfort, but no longer any sharp pain.  No fever, chills, nausea, vomiting.  No dysuria.      Past Medical History  Past Medical History:   Diagnosis Date   • Cataract    • Kidney stone    • Tremors of nervous system     essential        Past Surgical History  Past Surgical History:   Procedure Laterality Date   • CATARACT EXTRACTION, BILATERAL  01/02/2020 2018   • COLONOSCOPY N/A 1/13/2020    Procedure: COLONOSCOPY with hot biopsy polypectomies;  Surgeon: Cindy Terry MD;  Location: [facility] ENDOSCOPY;  Service: Gastroenterology   • VASECTOMY         Medications    Current Outpatient Medications:   •  latanoprost (XALATAN) 0.005 % ophthalmic solution, Administer 1 drop to both eyes Daily., Disp: , Rfl: 3  •  propranolol (INDERAL) 40 MG tablet, Take 1 tablet by mouth 3 (Three) Times a Day As Needed (tremors)., Disp: 60 tablet, Rfl: 2  •  propranolol LA (INDERAL LA) 60 MG 24 hr capsule, Take 1 capsule by mouth Daily., Disp: 90 capsule, Rfl: 3  •  timolol (TIMOPTIC) 0.5 % ophthalmic solution, INSTILL 1 DROP IN EACH EYE IN THE MORNING, Disp: , Rfl:     Allergies  No Known Allergies    Social History  Social History     Socioeconomic History   • Marital status:      Spouse name: Not on file   • Number of children: Not on file   • Years of education: Not on file   • Highest education level: Not on file   Tobacco Use   • Smoking status: Never Smoker   • Smokeless tobacco: Never Used   Substance and Sexual Activity   • Alcohol use: No   • Drug use: No   • Sexual activity: Defer       Family History  Family History   Problem Relation Age of Onset   •  Cancer Mother    • Hyperlipidemia Mother    • Kidney disease Mother    • Stroke Mother    • Cancer Sister    • Cancer Brother        Review of Systems  Constitutional: No fevers or chills  Skin: Negative for rash  Endocrine: No heat/cold intolerance   Cardiovascular: Negative for chest pain or dyspnea on exertion  Respiratory: Negative for shortness of breath or wheezing  Gastrointestinal: No constipation, nausea or vomiting  Genitourinary: No gross hematuria   Musculoskeletal: Negative for low back pain  Neurological:  Negative for frequent headaches or dizziness  Lymph/Heme: Negative for leg swelling or calf pain.    Physical Exam  There were no vitals taken for this visit.    Labs  Lab Results   Component Value Date    BUN 17 01/05/2021    CREATININE 0.93 01/05/2021    EGFRIFNONA 81 01/05/2021    EGFRIFAFRI 98 01/05/2021    BCR 18.3 01/05/2021    K 5.1 01/05/2021    CO2 29.7 (H) 01/05/2021    CALCIUM 10.8 (H) 01/05/2021    PROTENTOTREF 6.7 01/05/2021    ALBUMIN 4.30 01/05/2021    LABIL2 1.8 01/05/2021    AST 15 01/05/2021    ALT 17 01/05/2021       Lab Results   Component Value Date    WBC 5.26 01/05/2021    HGB 15.6 01/05/2021    HCT 46.0 01/05/2021    MCV 90.9 01/05/2021     01/05/2021       Uric Acid   Date Value Ref Range Status   01/05/2021 4.2 3.4 - 7.0 mg/dL Final       Lab Results   Component Value Date    CALCIUM 10.8 (H) 01/05/2021       Brief Urine Lab Results  (Last result in the past 365 days)      Color   Clarity   Blood   Leuk Est   Nitrite   Protein   CREAT   Urine HCG        02/25/21 0821 Yellow Clear 1+ Negative Negative Negative             Lab Results   Component Value Date    PSA 0.639 10/05/2017       No results found for: URINECX    )No components found for: STONEANALYSI      Radiologic Studies  Ct Abdomen Pelvis Stone Protocol  Result Date: 1/17/2021  4 mm obstructing left ureteral calculus.  Bilateral nonobstructing renal calculi. Authenticated by Rodrigo Gutierrez MD on 01/17/2021  09:18:43 PM    Us Venous Doppler Lower Extremity Left (duplex)    Result Date: 1/1/2021  No evidence of DVT. Authenticated by Frank Son M.D. on 01/01/2021 04:59:38 PM    Ct Abdomen Pelvis Stone Protocol    Result Date: 3/4/2021  1. Left UVJ stone causing mild obstruction measuring 4 x 6 mm. 2. Bilateral nephrolithiasis  This study was performed with techniques to keep radiation doses as low as reasonably achievable (ALARA). Individualized dose reduction techniques using automated exposure control or adjustment of vA and/or kV according to the patient size were employed.  This report was finalized on 3/4/2021 8:54 AM by Dickson Balbuena MD.    Ct Abdomen Pelvis Stone Protocol    Result Date: 1/17/2021  4 mm obstructing left ureteral calculus.  Bilateral nonobstructing renal calculi. Authenticated by Rodrigo Gutierrez MD on 01/17/2021 09:18:43 PM      I have personally reviewed these labs and images.        Assessment  Mr. Rivas is a 70 y.o. male with bilateral kidney stones , the largest of which is 13 mm in the left lower pole kidney.  He now has a 7 mm left distal ureteral stone.  He is not having any sharp pain, but has had intermittent discomfort and has not seen it pass.  On further follow-up imaging the distal stone actually looks to be 9 mm.    We had informed discussion about the causes of stones, in the main treatments for nephrolithiasis in 2019.  The 3 main surgical treatments for stones are percutaneous nephrolithotomy, ureteroscopy and laser lithotripsy, and shockwave lithotripsy.  Based on the stone size and location, I have recommended treatment for the distal ureteral stone.  We will discuss the other stones in the future, though I did briefly mention that we will likely be looking at mini PCNL for the left lower pole renal stone.  We discussed the risks, benefits, and alternatives to this strategy.  He voiced his understanding of the risks.    Plan  1.  Schedule Left URS/HLL stent     This visit has been  rescheduled as a phone visit to comply with patient safety concerns in accordance with CDC recommendations. Total time of discussion was 22 minutes.

## 2021-03-10 ENCOUNTER — TELEPHONE (OUTPATIENT)
Dept: UROLOGY | Facility: CLINIC | Age: 71
End: 2021-03-10

## 2021-03-11 DIAGNOSIS — N20.0 NEPHROLITHIASIS: Primary | ICD-10-CM

## 2021-03-11 RX ORDER — OXYCODONE HYDROCHLORIDE 5 MG/1
5 TABLET ORAL EVERY 6 HOURS PRN
Qty: 5 TABLET | Refills: 0 | Status: SHIPPED | OUTPATIENT
Start: 2021-03-11 | End: 2021-03-24

## 2021-03-11 RX ORDER — DOCUSATE SODIUM 100 MG/1
100 CAPSULE, LIQUID FILLED ORAL 2 TIMES DAILY
Qty: 15 CAPSULE | Refills: 1 | Status: SHIPPED | OUTPATIENT
Start: 2021-03-11 | End: 2021-03-24

## 2021-03-11 RX ORDER — ACETAMINOPHEN 325 MG/1
650 TABLET ORAL EVERY 6 HOURS
Qty: 30 TABLET | Refills: 0 | Status: SHIPPED | OUTPATIENT
Start: 2021-03-11 | End: 2021-03-14

## 2021-03-24 ENCOUNTER — APPOINTMENT (OUTPATIENT)
Dept: PREADMISSION TESTING | Facility: HOSPITAL | Age: 71
End: 2021-03-24

## 2021-03-24 VITALS — HEIGHT: 67 IN | BODY MASS INDEX: 32.65 KG/M2 | WEIGHT: 208 LBS

## 2021-03-24 DIAGNOSIS — N20.0 NEPHROLITHIASIS: ICD-10-CM

## 2021-03-24 LAB
BACTERIA UR QL AUTO: ABNORMAL /HPF
BILIRUB UR QL STRIP: NEGATIVE
CLARITY UR: CLEAR
COLOR UR: YELLOW
GLUCOSE UR STRIP-MCNC: ABNORMAL MG/DL
HGB UR QL STRIP.AUTO: NEGATIVE
HYALINE CASTS UR QL AUTO: ABNORMAL /LPF
KETONES UR QL STRIP: NEGATIVE
LEUKOCYTE ESTERASE UR QL STRIP.AUTO: ABNORMAL
MUCOUS THREADS URNS QL MICRO: ABNORMAL /HPF
NITRITE UR QL STRIP: NEGATIVE
PH UR STRIP.AUTO: 7 [PH] (ref 5–8)
PROT UR QL STRIP: ABNORMAL
RBC # UR: ABNORMAL /HPF
REF LAB TEST METHOD: ABNORMAL
SP GR UR STRIP: 1.02 (ref 1–1.03)
SQUAMOUS #/AREA URNS HPF: ABNORMAL /HPF
UROBILINOGEN UR QL STRIP: ABNORMAL
WBC UR QL AUTO: ABNORMAL /HPF

## 2021-03-24 PROCEDURE — 81001 URINALYSIS AUTO W/SCOPE: CPT

## 2021-03-24 PROCEDURE — 93005 ELECTROCARDIOGRAM TRACING: CPT

## 2021-03-24 PROCEDURE — 80048 BASIC METABOLIC PNL TOTAL CA: CPT | Performed by: UROLOGY

## 2021-03-24 PROCEDURE — 85027 COMPLETE CBC AUTOMATED: CPT | Performed by: UROLOGY

## 2021-03-24 PROCEDURE — 87086 URINE CULTURE/COLONY COUNT: CPT

## 2021-03-25 LAB
BACTERIA SPEC AEROBE CULT: NO GROWTH
QT INTERVAL: 358 MS
QTC INTERVAL: 369 MS

## 2021-04-02 ENCOUNTER — TRANSCRIBE ORDERS (OUTPATIENT)
Dept: LAB | Facility: HOSPITAL | Age: 71
End: 2021-04-02

## 2021-04-02 DIAGNOSIS — Z01.818 PRE-OP TESTING: Primary | ICD-10-CM

## 2021-04-05 ENCOUNTER — LAB (OUTPATIENT)
Dept: LAB | Facility: HOSPITAL | Age: 71
End: 2021-04-05

## 2021-04-05 DIAGNOSIS — Z01.818 PRE-OP TESTING: ICD-10-CM

## 2021-04-05 LAB — SARS-COV-2 RNA NOSE QL NAA+PROBE: NOT DETECTED

## 2021-04-05 PROCEDURE — C9803 HOPD COVID-19 SPEC COLLECT: HCPCS

## 2021-04-05 PROCEDURE — U0005 INFEC AGEN DETEC AMPLI PROBE: HCPCS

## 2021-04-05 PROCEDURE — U0004 COV-19 TEST NON-CDC HGH THRU: HCPCS

## 2021-04-07 ENCOUNTER — HOSPITAL ENCOUNTER (OUTPATIENT)
Facility: HOSPITAL | Age: 71
Setting detail: HOSPITAL OUTPATIENT SURGERY
Discharge: HOME | End: 2021-04-07
Attending: UROLOGY | Admitting: UROLOGY

## 2021-04-07 ENCOUNTER — ANESTHESIA (OUTPATIENT)
Dept: PERIOP | Facility: HOSPITAL | Age: 71
End: 2021-04-07

## 2021-04-07 ENCOUNTER — ANESTHESIA EVENT (OUTPATIENT)
Dept: PERIOP | Facility: HOSPITAL | Age: 71
End: 2021-04-07

## 2021-04-07 VITALS
SYSTOLIC BLOOD PRESSURE: 145 MMHG | OXYGEN SATURATION: 100 % | HEART RATE: 80 BPM | DIASTOLIC BLOOD PRESSURE: 74 MMHG | RESPIRATION RATE: 16 BRPM | TEMPERATURE: 98.2 F

## 2021-04-07 DIAGNOSIS — N20.0 NEPHROLITHIASIS: ICD-10-CM

## 2021-04-07 PROCEDURE — 25010000002 IOPAMIDOL 61 % SOLUTION: Performed by: UROLOGY

## 2021-04-07 PROCEDURE — 25010000002 FENTANYL CITRATE (PF) 100 MCG/2ML SOLUTION: Performed by: NURSE ANESTHETIST, CERTIFIED REGISTERED

## 2021-04-07 PROCEDURE — 25010000002 DEXAMETHASONE PER 1 MG: Performed by: NURSE ANESTHETIST, CERTIFIED REGISTERED

## 2021-04-07 PROCEDURE — C1769 GUIDE WIRE: HCPCS | Performed by: UROLOGY

## 2021-04-07 PROCEDURE — C1758 CATHETER, URETERAL: HCPCS | Performed by: UROLOGY

## 2021-04-07 PROCEDURE — 25010000002 PROPOFOL 200 MG/20ML EMULSION: Performed by: NURSE ANESTHETIST, CERTIFIED REGISTERED

## 2021-04-07 PROCEDURE — 52356 CYSTO/URETERO W/LITHOTRIPSY: CPT | Performed by: UROLOGY

## 2021-04-07 PROCEDURE — 82365 CALCULUS SPECTROSCOPY: CPT | Performed by: UROLOGY

## 2021-04-07 PROCEDURE — C2617 STENT, NON-COR, TEM W/O DEL: HCPCS | Performed by: UROLOGY

## 2021-04-07 PROCEDURE — 25010000002 ONDANSETRON PER 1 MG: Performed by: NURSE ANESTHETIST, CERTIFIED REGISTERED

## 2021-04-07 PROCEDURE — 25010000003 CEFAZOLIN SODIUM-DEXTROSE 2-3 GM-%(50ML) RECONSTITUTED SOLUTION: Performed by: UROLOGY

## 2021-04-07 PROCEDURE — C1894 INTRO/SHEATH, NON-LASER: HCPCS | Performed by: UROLOGY

## 2021-04-07 PROCEDURE — 25010000002 KETOROLAC TROMETHAMINE PER 15 MG: Performed by: NURSE ANESTHETIST, CERTIFIED REGISTERED

## 2021-04-07 DEVICE — URETERAL STENT
Type: IMPLANTABLE DEVICE | Site: URETER | Status: FUNCTIONAL
Brand: CONTOUR™

## 2021-04-07 RX ORDER — PROPOFOL 10 MG/ML
INJECTION, EMULSION INTRAVENOUS AS NEEDED
Status: DISCONTINUED | OUTPATIENT
Start: 2021-04-07 | End: 2021-04-07 | Stop reason: SURG

## 2021-04-07 RX ORDER — KETAMINE HYDROCHLORIDE 50 MG/ML
INJECTION, SOLUTION, CONCENTRATE INTRAMUSCULAR; INTRAVENOUS AS NEEDED
Status: DISCONTINUED | OUTPATIENT
Start: 2021-04-07 | End: 2021-04-07 | Stop reason: SURG

## 2021-04-07 RX ORDER — ONDANSETRON 2 MG/ML
INJECTION INTRAMUSCULAR; INTRAVENOUS AS NEEDED
Status: DISCONTINUED | OUTPATIENT
Start: 2021-04-07 | End: 2021-04-07 | Stop reason: SURG

## 2021-04-07 RX ORDER — LORAZEPAM 2 MG/ML
1 INJECTION INTRAMUSCULAR EVERY 4 HOURS PRN
Status: DISCONTINUED | OUTPATIENT
Start: 2021-04-07 | End: 2021-04-07 | Stop reason: HOSPADM

## 2021-04-07 RX ORDER — DEXAMETHASONE SODIUM PHOSPHATE 4 MG/ML
INJECTION, SOLUTION INTRA-ARTICULAR; INTRALESIONAL; INTRAMUSCULAR; INTRAVENOUS; SOFT TISSUE AS NEEDED
Status: DISCONTINUED | OUTPATIENT
Start: 2021-04-07 | End: 2021-04-07 | Stop reason: SURG

## 2021-04-07 RX ORDER — ATROPA BELLADONNA AND OPIUM 16.2; 3 MG/1; MG/1
SUPPOSITORY RECTAL AS NEEDED
Status: DISCONTINUED | OUTPATIENT
Start: 2021-04-07 | End: 2021-04-07 | Stop reason: HOSPADM

## 2021-04-07 RX ORDER — CIPROFLOXACIN 500 MG/1
500 TABLET, FILM COATED ORAL 2 TIMES DAILY
Qty: 6 TABLET | Refills: 0 | Status: SHIPPED | OUTPATIENT
Start: 2021-04-07 | End: 2022-04-01

## 2021-04-07 RX ORDER — ACETAMINOPHEN 325 MG/1
650 TABLET ORAL EVERY 6 HOURS
Qty: 30 TABLET | Refills: 0 | Status: SHIPPED | OUTPATIENT
Start: 2021-04-07 | End: 2021-04-10

## 2021-04-07 RX ORDER — DOCUSATE SODIUM 100 MG/1
100 CAPSULE, LIQUID FILLED ORAL 2 TIMES DAILY
Qty: 15 CAPSULE | Refills: 1 | Status: SHIPPED | OUTPATIENT
Start: 2021-04-07 | End: 2022-04-01

## 2021-04-07 RX ORDER — ONDANSETRON 2 MG/ML
4 INJECTION INTRAMUSCULAR; INTRAVENOUS ONCE AS NEEDED
Status: DISCONTINUED | OUTPATIENT
Start: 2021-04-07 | End: 2021-04-07 | Stop reason: HOSPADM

## 2021-04-07 RX ORDER — CEFAZOLIN SODIUM 2 G/50ML
2 SOLUTION INTRAVENOUS ONCE
Status: COMPLETED | OUTPATIENT
Start: 2021-04-07 | End: 2021-04-07

## 2021-04-07 RX ORDER — KETOROLAC TROMETHAMINE 30 MG/ML
INJECTION, SOLUTION INTRAMUSCULAR; INTRAVENOUS AS NEEDED
Status: DISCONTINUED | OUTPATIENT
Start: 2021-04-07 | End: 2021-04-07 | Stop reason: SURG

## 2021-04-07 RX ORDER — FENTANYL CITRATE 50 UG/ML
INJECTION, SOLUTION INTRAMUSCULAR; INTRAVENOUS AS NEEDED
Status: DISCONTINUED | OUTPATIENT
Start: 2021-04-07 | End: 2021-04-07 | Stop reason: SURG

## 2021-04-07 RX ORDER — OXYCODONE HYDROCHLORIDE 5 MG/1
5 TABLET ORAL EVERY 6 HOURS PRN
Qty: 5 TABLET | Refills: 0 | Status: SHIPPED | OUTPATIENT
Start: 2021-04-07 | End: 2022-04-01

## 2021-04-07 RX ORDER — PHENAZOPYRIDINE HYDROCHLORIDE 100 MG/1
100 TABLET, FILM COATED ORAL 3 TIMES DAILY PRN
Qty: 21 TABLET | Refills: 0 | Status: SHIPPED | OUTPATIENT
Start: 2021-04-07 | End: 2022-04-01

## 2021-04-07 RX ORDER — MEPERIDINE HYDROCHLORIDE 25 MG/ML
50 INJECTION INTRAMUSCULAR; INTRAVENOUS; SUBCUTANEOUS
Status: DISCONTINUED | OUTPATIENT
Start: 2021-04-07 | End: 2021-04-07 | Stop reason: HOSPADM

## 2021-04-07 RX ORDER — LIDOCAINE HYDROCHLORIDE 20 MG/ML
INJECTION, SOLUTION INTRAVENOUS AS NEEDED
Status: DISCONTINUED | OUTPATIENT
Start: 2021-04-07 | End: 2021-04-07 | Stop reason: SURG

## 2021-04-07 RX ORDER — SODIUM CHLORIDE 0.9 % (FLUSH) 0.9 %
10 SYRINGE (ML) INJECTION AS NEEDED
Status: DISCONTINUED | OUTPATIENT
Start: 2021-04-07 | End: 2021-04-07 | Stop reason: HOSPADM

## 2021-04-07 RX ORDER — OXYBUTYNIN CHLORIDE 10 MG/1
10 TABLET, EXTENDED RELEASE ORAL DAILY PRN
Qty: 10 TABLET | Refills: 0 | Status: SHIPPED | OUTPATIENT
Start: 2021-04-07 | End: 2022-04-01

## 2021-04-07 RX ORDER — TAMSULOSIN HYDROCHLORIDE 0.4 MG/1
1 CAPSULE ORAL NIGHTLY
Qty: 10 CAPSULE | Refills: 0 | Status: SHIPPED | OUTPATIENT
Start: 2021-04-07 | End: 2022-04-01

## 2021-04-07 RX ORDER — SODIUM CHLORIDE, SODIUM LACTATE, POTASSIUM CHLORIDE, CALCIUM CHLORIDE 600; 310; 30; 20 MG/100ML; MG/100ML; MG/100ML; MG/100ML
100 INJECTION, SOLUTION INTRAVENOUS CONTINUOUS
Status: DISCONTINUED | OUTPATIENT
Start: 2021-04-07 | End: 2021-04-07 | Stop reason: HOSPADM

## 2021-04-07 RX ADMIN — DEXAMETHASONE SODIUM PHOSPHATE 10 MG: 4 INJECTION, SOLUTION INTRAMUSCULAR; INTRAVENOUS at 09:50

## 2021-04-07 RX ADMIN — KETOROLAC TROMETHAMINE 30 MG: 30 INJECTION, SOLUTION INTRAMUSCULAR at 09:50

## 2021-04-07 RX ADMIN — FENTANYL CITRATE 100 MCG: 50 INJECTION INTRAMUSCULAR; INTRAVENOUS at 09:50

## 2021-04-07 RX ADMIN — PROPOFOL 200 MG: 10 INJECTION, EMULSION INTRAVENOUS at 09:50

## 2021-04-07 RX ADMIN — SODIUM CHLORIDE, POTASSIUM CHLORIDE, SODIUM LACTATE AND CALCIUM CHLORIDE 100 ML/HR: 600; 310; 30; 20 INJECTION, SOLUTION INTRAVENOUS at 08:53

## 2021-04-07 RX ADMIN — LIDOCAINE HYDROCHLORIDE 100 MG: 20 INJECTION, SOLUTION INTRAVENOUS at 09:50

## 2021-04-07 RX ADMIN — GLYCOPYRROLATE 0.2 MCG: 0.2 INJECTION, SOLUTION INTRAMUSCULAR; INTRAVITREAL at 09:50

## 2021-04-07 RX ADMIN — SODIUM CHLORIDE, POTASSIUM CHLORIDE, SODIUM LACTATE AND CALCIUM CHLORIDE: 600; 310; 30; 20 INJECTION, SOLUTION INTRAVENOUS at 10:19

## 2021-04-07 RX ADMIN — CEFAZOLIN SODIUM 2 G: 2 SOLUTION INTRAVENOUS at 09:47

## 2021-04-07 RX ADMIN — ONDANSETRON 4 MG: 2 INJECTION INTRAMUSCULAR; INTRAVENOUS at 09:50

## 2021-04-07 RX ADMIN — KETAMINE HYDROCHLORIDE 50 MG: 50 INJECTION, SOLUTION INTRAMUSCULAR; INTRAVENOUS at 09:50

## 2021-04-07 NOTE — ANESTHESIA PREPROCEDURE EVALUATION
Anesthesia Evaluation     Patient summary reviewed and Nursing notes reviewed   no history of anesthetic complications:  NPO Solid Status: > 8 hours  NPO Liquid Status: > 8 hours           Airway   Mallampati: II  TM distance: >3 FB  Neck ROM: full  no difficulty expected  Dental - normal exam     Pulmonary - negative pulmonary ROS and normal exam   Cardiovascular - negative cardio ROS and normal exam    Rhythm: regular  Rate: normal        Neuro/Psych  (+) tremors,     GI/Hepatic/Renal/Endo    (+)   renal disease stones,     Musculoskeletal (-) negative ROS    Abdominal    Substance History - negative use     OB/GYN negative ob/gyn ROS         Other - negative ROS                       Anesthesia Plan    ASA 2     general   (Risks and benefits discussed including risk of aspiration, recall and dental damage. All patient questions answered.    Patient told that either a breathing mask or a breathing tube will be used to manage the airway.    Will continue with plan of care.)  intravenous induction     Anesthetic plan, all risks, benefits, and alternatives have been provided, discussed and informed consent has been obtained with: patient.

## 2021-04-07 NOTE — ANESTHESIA PROCEDURE NOTES
Airway  Urgency: elective    Date/Time: 4/7/2021 9:51 AM  Airway not difficult    General Information and Staff    Patient location during procedure: OR  CRNA: Roland Ahn CRNA    Indications and Patient Condition  Indications for airway management: airway protection    Preoxygenated: yes  Mask difficulty assessment: 1 - vent by mask    Final Airway Details  Final airway type: supraglottic airway      Successful airway: unique  Size 5    Number of attempts at approach: 1  Assessment: lips, teeth, and gum same as pre-op

## 2021-04-07 NOTE — OP NOTE
Preoperative diagnosis  left kidney stone    Postoperative diagnosis  left kidney stone  Ureteral stone had passed    Procedure performed  1.  Flexible cystoscopy, left retrograde pyelogram with ureteral stent placement 6 Fr x 26 cm  2.  left ureteroscopy with holmium-YAG laser lithotripsy and basket extraction of stone fragments (41915)   3.  Fluoroscopy time < 1 hour with interpretation of images    Surgeon  Brenton Walker MD    Anesthesia  General    Complications  None    Specimen  Stone fragments for biochemical analysis    Findings  Urethroscopy revealed no strictures or other abnormalities.  Cystoscopy revealed no tumors, stones or other mucosal abnormalities.    left retrograde pyelogram revealed a delicate system with identification of the large renal stone seen on pre-op imaging.  No other filling defects and caliber of left ureter was smooth and normal.    Ureteroscopy revealed that the patient had passed the ureteral stone.  The large renal stone was still in the lower pole of the kidney but broke up very nicely and was able to be irrigated out of the lower pole with only dustlike pieces such that he will not need a second look.     Indications  70 y.o. male agreed to undergo the above named procedure after discussion of the alternatives, risks and benefits.  Informed consent was obtained.      Procedure  The patient was taken to the operating room and placed supine on the operating table.  Pre-operative antibiotics were administered.  Bilateral lower extremity SCDs were placed.  After induction of general anesthesia the patient was positioned in dorsal lithotomy, prepped and draped in a sterile fashion.  A time-out was performed.      A 14-Gabonese flexible cystoscope was passed carefully via urethra into the bladder.  The left ureteral orifice was identified and a Sensor wire was passed retrograde to the level of the kidney and confirmed by fluoroscopy.  The flexible scope was off-loaded and the bladder  emptied with a straight catheter.  I then inspected the entire ureter with a semirigid ureteroscope and no ureteral stones whatsoever were identified.    A retrograde pyelogram was performed by slowly injecting 5 mL of 50% Omnipaque contrast via the 5-Eritrean catheter with findings described above.  An Amplatz super-stiff was placed to the level of the renal pelvis and confirmed by fluoroscopy. The dual lumen was removed. The Sensor wire was clipped to the drape as a safety wire.  A 12/14, 46-cm access sheath was advanced over the super-stiff wire to level of the UPJ under direct fluoroscopic guidance. The inner stylet and super-stiff wire were removed and the sheath was sutured in place with 2-0 silk.  The kidney was entered with the flexible ureteroscope.  The kidney stone was identified and fragmented with a 200-micron holmium YAG laser fiber at laser settings of 0.3 joules and a frequency of 50 Hz. The fragments were basket extracted. At the completion of the procedure, all clinically significant fragments were removed and only dust-like fragments remained.  The access sheath was removed under direct vision.  Ureteral edema but no obvious obstruction was present.  A 5-Eritrean catheter was passed over the safety wire and the wire withdrawn.   Contrast was injected into the renal pelvis via the 5-Eritrean open-ended catheter.  A super-stiff wire was placed and confirmed by fluoroscopy.  A 6 Fr x 26 cm stent was positioned with the upper end in the kidney and the lower in the bladder confirmed by fluoroscopy. The bladder was emptied and the procedure was complete. The patient tolerated the procedure well and was stable throughout.    The patient will remove his stent at home next week and follow-up with me in 2 months with a renal ultrasound prior.

## 2021-04-07 NOTE — ANESTHESIA POSTPROCEDURE EVALUATION
Patient: Adonay Rivas    Procedure Summary     Date: 04/07/21 Room / Location: [facility]O /  [facility] OR    Anesthesia Start: 0947 Anesthesia Stop: 1050    Procedure: CYSTOSCOPY, URETEROSCOPY LASER LITHOTRIPSY WITH BASKET EXTRACTION OF STONE FRAGMENTS, RETROGRADE PYELOGRAM AND  STENT INSERTION (Left ) Diagnosis:       Nephrolithiasis      (Nephrolithiasis [N20.0])    Surgeons: Brenton Walker MD Provider: Roland Ahn CRNA    Anesthesia Type: general ASA Status: 2          Anesthesia Type: general    Vitals  Vitals Value Taken Time   /70 04/07/21 1225   Temp 97.5 °F (36.4 °C) 04/07/21 1200   Pulse 82 04/07/21 1225   Resp 14 04/07/21 1225   SpO2 100 % 04/07/21 1225           Post Anesthesia Care and Evaluation    Patient location during evaluation: PACU  Patient participation: complete - patient participated  Level of consciousness: awake  Pain score: 1  Pain management: adequate  Airway patency: patent  Anesthetic complications: No anesthetic complications  PONV Status: controlled  Cardiovascular status: acceptable and stable  Respiratory status: acceptable  Hydration status: acceptable

## 2021-04-07 NOTE — DISCHARGE INSTRUCTIONS
Home Care After Ureteroscopy and Laser Lithotripsy  The following instructions will help you care for yourself, or be cared for upon your return home today.    These are guidelines for your care right after surgery only.     Diet  Drink plenty of liquids and eat light meals today.    Start your regular diet tomorrow.    Activity  Start normal activities in twenty-four (24) hours.    Wound Care and Hygiene  No restrictions, start normal routine.    Anesthesia Precautions & Expectations  After anesthesia, rest for 24 hours.    Do not drive, drink alcoholic beverages or make any important decisions during this time.  General anesthesia may cause a sore throat, jaw discomfort or muscle aches.    These symptoms can last for one or two days.     What to Expect after Surgery  Mild pain with voiding.  Frequency or urgency.  Bladder cramps.  Minimal bleeding with voiding.    Call your Doctor  Passing clots in urine preventing bladder emptying  Severe pain not controlled by oral medication  Temperature above 101.5 degrees  Inability to urinate within eight (8) hours after surgery    After Stent Placement  It is common to have blood tinged urine for 3-5 days.  It is common to have pain in your side and in your back when you urinate for 3-5 days.  It is common to have urgency with urination.  This is a temporary stent and you will remove at home in 1 week from your surgery.     Other Contacts  Urology Office:  [address] [facility] [id number]   [place] [id number] [phone] office  [phone] fax    Other Instructions  Take tamsulosin daily until the stent comes out.  Take oxybutynin daily as needed for bladder spasm while the stent is in.  Take Pyridium up to 3 times daily as needed for burning with urination.   Take Tylenol scheduled every 6 hours for the first 3 days.  Take the oxycodone on top of that as needed.  Take all of the antibiotics.     To remove the stent, take off the wrapping, grasp the string, and pull  until you see both currently ends of the blue plastic stent come out.  Take some pain medication before you do this.  Do this in the morning so that you can call the office if there are issues.    Follow up Appointment  Please call Dr. Walker's office to schedule follow-up appointment in 8 weeks with kidney ultrasound beforehand.  Cancel the appointment for next week for stent removal since he will do it at home.

## 2021-04-07 NOTE — H&P
HPI  Adonay Rivas is a 70 y.o. with history of   1. Nephrolithiasis         No recent fevers or new LUTS  Does not take any blood thinners    Past Medical History  Past Medical History:   Diagnosis Date   • Cataract    • Glaucoma    • Kidney stone    • Tremors of nervous system     essential        Past Surgical History  Past Surgical History:   Procedure Laterality Date   • CATARACT EXTRACTION, BILATERAL  01/02/2020 2018   • COLONOSCOPY N/A 1/13/2020    Procedure: COLONOSCOPY with hot biopsy polypectomies;  Surgeon: Cindy Terry MD;  Location: [facility] ENDOSCOPY;  Service: Gastroenterology   • VASECTOMY         Medications    Current Facility-Administered Medications:   •  ceFAZolin Sodium-Dextrose (ANCEF) IVPB (duplex) 2 g, 2 g, Intravenous, Once, Brenton Walker MD  •  lactated ringers infusion, 100 mL/hr, Intravenous, Continuous, Brenton Walker MD  •  sodium chloride 0.9 % flush 10 mL, 10 mL, Intravenous, PRN, Brenton Walker MD    Allergies  No Known Allergies    Social History  Social History     Socioeconomic History   • Marital status:      Spouse name: Not on file   • Number of children: Not on file   • Years of education: Not on file   • Highest education level: Not on file   Tobacco Use   • Smoking status: Never Smoker   • Smokeless tobacco: Never Used   Vaping Use   • Vaping Use: Never used   Substance and Sexual Activity   • Alcohol use: No   • Drug use: No   • Sexual activity: Defer       Review of Systems  Constitutional: No fevers or chills  Skin: Negative for rash  Endocrine: No heat/cold intolerance   Cardiovascular: Negative for chest pain or dyspnea on exertion  Respiratory: Negative for shortness of breath or wheezing  Gastrointestinal: No constipation, nausea or vomiting  Genitourinary: Negative for new lower urinary tract symptoms, current gross hematuria or dysuria.  Musculoskeletal: No flank pain  Neurological:  Negative for frequent headaches or  dizziness  Lymph/Heme: Negative for leg swelling or calf pain.    Physical Exam  Visit Vitals  /73 (BP Location: Left arm, Patient Position: Sitting)   Pulse 84   Temp 97.5 °F (36.4 °C) (Temporal)   Resp 17   SpO2 96%     Constitutional: NAD, WDWN.   HEENT: NCAT. Conjunctivae normal.  MMM.    Cardiovascular: Regular rate.  Pulmonary/Chest: Respirations are even and non-labored bilaterally.  Abdominal: Soft. No distension, tenderness, masses or guarding. No CVA tenderness.  Neurological: A + O x 3.  Cranial Nerves II-XII grossly intact. Normal gait.  Extremities: RENE x 4, Warm. No clubbing.  No cyanosis.    Skin: Pink, warm and dry.  No rashes noted.  Psychiatric:  Normal mood and affect    Labs  @LASTLABOSUSHORT(SODIUM,POTASSIUM,CHLORIDE,CO2,BUN,CREATSERUM,GLUCOSE)@  @LASTLABOSUSHORT(WBC,WBCCOUNT,WBCFETAL,HGB,HCT,PLATELET,MCV)@   No results found for: PSA  No components found for: CREATSERUM      Assessment  Adonay Rivas is a 70 y.o. male who presents with the following diagnosis:  1. Nephrolithiasis         Plan  1. To OR for LEFT URETEROSCOPY LASER LITHOTRIPSY WITH STENT INSERTION     Brenton Walker MD

## 2021-04-13 ENCOUNTER — TELEPHONE (OUTPATIENT)
Dept: UROLOGY | Facility: CLINIC | Age: 71
End: 2021-04-13

## 2021-04-13 LAB
CALCIUM OXALATE DIHYDRATE MFR STONE IR: 80 %
COLOR STONE: NORMAL
COM MFR STONE: 20 %
COMPN STONE: NORMAL
LABORATORY COMMENT REPORT: NORMAL
Lab: NORMAL
Lab: NORMAL
PHOTO: NORMAL
SIZE STONE: NORMAL MM
SPEC SOURCE SUBJ: NORMAL
WT STONE: 5 MG

## 2021-04-13 NOTE — TELEPHONE ENCOUNTER
Pt called concerned about pain in abdomen. I explained this is likely from the stent. Pt will remove at home tomorrow and call back with any issues.

## 2021-06-01 ENCOUNTER — HOSPITAL ENCOUNTER (OUTPATIENT)
Dept: ULTRASOUND IMAGING | Facility: HOSPITAL | Age: 71
Discharge: HOME | End: 2021-06-01
Admitting: UROLOGY

## 2021-06-01 DIAGNOSIS — N20.0 NEPHROLITHIASIS: ICD-10-CM

## 2021-06-01 PROCEDURE — 76775 US EXAM ABDO BACK WALL LIM: CPT

## 2022-04-01 ENCOUNTER — HOSPITAL ENCOUNTER (OUTPATIENT)
Dept: GENERAL RADIOLOGY | Facility: HOSPITAL | Age: 72
Discharge: HOME | End: 2022-04-01
Admitting: INTERNAL MEDICINE

## 2022-04-01 ENCOUNTER — OFFICE VISIT (OUTPATIENT)
Dept: INTERNAL MEDICINE | Facility: CLINIC | Age: 72
End: 2022-04-01

## 2022-04-01 VITALS
DIASTOLIC BLOOD PRESSURE: 68 MMHG | HEART RATE: 75 BPM | HEIGHT: 67 IN | WEIGHT: 211 LBS | OXYGEN SATURATION: 97 % | SYSTOLIC BLOOD PRESSURE: 120 MMHG | BODY MASS INDEX: 33.12 KG/M2 | TEMPERATURE: 97.8 F

## 2022-04-01 DIAGNOSIS — G25.0 ESSENTIAL TREMOR: Primary | ICD-10-CM

## 2022-04-01 DIAGNOSIS — M54.50 CHRONIC MIDLINE LOW BACK PAIN WITHOUT SCIATICA: ICD-10-CM

## 2022-04-01 DIAGNOSIS — R73.9 HYPERGLYCEMIA: ICD-10-CM

## 2022-04-01 DIAGNOSIS — N20.0 NEPHROLITHIASIS: ICD-10-CM

## 2022-04-01 DIAGNOSIS — G89.29 CHRONIC MIDLINE LOW BACK PAIN WITHOUT SCIATICA: ICD-10-CM

## 2022-04-01 DIAGNOSIS — E78.2 MIXED HYPERLIPIDEMIA: ICD-10-CM

## 2022-04-01 LAB
ALBUMIN SERPL-MCNC: 4.3 G/DL (ref 3.5–5.2)
ALBUMIN/GLOB SERPL: 1.9 G/DL
ALP SERPL-CCNC: 109 U/L (ref 39–117)
ALT SERPL-CCNC: 18 U/L (ref 1–41)
AST SERPL-CCNC: 17 U/L (ref 1–40)
BILIRUB SERPL-MCNC: 1.2 MG/DL (ref 0–1.2)
BUN SERPL-MCNC: 13 MG/DL (ref 8–23)
BUN/CREAT SERPL: 14.6 (ref 7–25)
CALCIUM SERPL-MCNC: 11 MG/DL (ref 8.6–10.5)
CHLORIDE SERPL-SCNC: 102 MMOL/L (ref 98–107)
CHOLEST SERPL-MCNC: 176 MG/DL (ref 0–200)
CO2 SERPL-SCNC: 28.8 MMOL/L (ref 22–29)
CREAT SERPL-MCNC: 0.89 MG/DL (ref 0.76–1.27)
EGFRCR SERPLBLD CKD-EPI 2021: 92.8 ML/MIN/1.73
GLOBULIN SER CALC-MCNC: 2.3 GM/DL
GLUCOSE SERPL-MCNC: 93 MG/DL (ref 65–99)
HBA1C MFR BLD: 5.7 % (ref 4.8–5.6)
HDLC SERPL-MCNC: 45 MG/DL (ref 40–60)
LDLC SERPL CALC-MCNC: 119 MG/DL (ref 0–100)
POTASSIUM SERPL-SCNC: 4.9 MMOL/L (ref 3.5–5.2)
PROT SERPL-MCNC: 6.6 G/DL (ref 6–8.5)
SODIUM SERPL-SCNC: 138 MMOL/L (ref 136–145)
TRIGL SERPL-MCNC: 65 MG/DL (ref 0–150)
VLDLC SERPL CALC-MCNC: 12 MG/DL (ref 5–40)

## 2022-04-01 PROCEDURE — G0439 PPPS, SUBSEQ VISIT: HCPCS | Performed by: INTERNAL MEDICINE

## 2022-04-01 PROCEDURE — 1125F AMNT PAIN NOTED PAIN PRSNT: CPT | Performed by: INTERNAL MEDICINE

## 2022-04-01 PROCEDURE — 1159F MED LIST DOCD IN RCRD: CPT | Performed by: INTERNAL MEDICINE

## 2022-04-01 PROCEDURE — 72110 X-RAY EXAM L-2 SPINE 4/>VWS: CPT

## 2022-04-01 PROCEDURE — 1170F FXNL STATUS ASSESSED: CPT | Performed by: INTERNAL MEDICINE

## 2022-04-01 RX ORDER — PROPRANOLOL HCL 60 MG
60 CAPSULE, EXTENDED RELEASE 24HR ORAL DAILY
Qty: 90 CAPSULE | Refills: 3 | Status: ON HOLD | OUTPATIENT
Start: 2022-04-01 | End: 2023-03-31

## 2022-04-01 NOTE — PROGRESS NOTES
The ABCs of the Annual Wellness Visit  Subsequent Medicare Wellness Visit    Chief Complaint   Patient presents with   • Medicare Wellness-subsequent   • Back Pain     Low right side, no known injury. Symptoms started a few weeks ago       Subjective    History of Present Illness:  Adonay Rivas is a 71 y.o. male who presents for a Subsequent Medicare Wellness Visit.    The following portions of the patient's history were reviewed and   updated as appropriate: allergies, current medications, past family history, past medical history, past social history, past surgical history and problem list.    Compared to one year ago, the patient feels his physical   health is worse.    Compared to one year ago, the patient feels his mental   health is the same.    Recent Hospitalizations:  He was not admitted to the hospital during the last year.       Current Medical Providers:  Patient Care Team:  Checo Kidd MD as PCP - General (Internal Medicine)  Brenton Walker MD as Consulting Physician (Urology)    Outpatient Medications Prior to Visit   Medication Sig Dispense Refill   • latanoprost (XALATAN) 0.005 % ophthalmic solution Administer 1 drop to both eyes Daily.  3   • propranolol (INDERAL) 40 MG tablet Take 1 tablet by mouth 3 (Three) Times a Day As Needed (tremors). 60 tablet 2   • propranolol LA (INDERAL LA) 60 MG 24 hr capsule Take 1 capsule by mouth Daily. (Patient taking differently: Take 60 mg by mouth Daily As Needed. For essential tremors) 90 capsule 3   • timolol (TIMOPTIC) 0.5 % ophthalmic solution INSTILL 1 DROP IN EACH EYE IN THE MORNING     • ciprofloxacin (Cipro) 500 MG tablet Take 1 tablet by mouth 2 (Two) Times a Day. 6 tablet 0   • diclofenac (VOLTAREN) 50 MG EC tablet Take 1 tablet by mouth 2 (Two) Times a Day. 30 tablet 0   • docusate sodium (Colace) 100 MG capsule Take 1 capsule by mouth 2 (Two) Times a Day if taking oxycodone 15 capsule 1   • oxybutynin XL (Ditropan XL) 10 MG 24 hr  tablet Take 1 tablet by mouth Daily As Needed for bladder spasm. 10 tablet 0   • oxyCODONE (Roxicodone) 5 MG immediate release tablet Take 1 tablet by mouth Every 6 (Six) Hours As Needed for Moderate or Severe Pain . 5 tablet 0   • phenazopyridine (PYRIDIUM) 100 MG tablet Take 1 tablet by mouth 3 (Three) Times a Day As Needed for urinary burning. 21 tablet 0   • tamsulosin (FLOMAX) 0.4 MG capsule 24 hr capsule Take 1 capsule by mouth Every Night. 10 capsule 0     No facility-administered medications prior to visit.       No opioid medication identified on active medication list. I have reviewed chart for other potential  high risk medication/s and harmful drug interactions in the elderly.          Aspirin is not on active medication list.  Aspirin use is not indicated based on review of current medical condition/s. Risk of harm outweighs potential benefits.  .    Patient Active Problem List   Diagnosis   • Essential tremor   • Screening for colon cancer   • Colon cancer screening   • Nephrolithiasis   • Chronic midline low back pain without sciatica     Advance Care Planning  Advance Directive is not on file.  ACP discussion was held with the patient during this visit. Patient has an advance directive (not in EMR), copy requested.    Review of Systems   Constitutional: Negative for activity change, appetite change, fatigue and fever.   HENT: Negative for congestion, ear discharge, ear pain and trouble swallowing.    Eyes: Negative for photophobia and visual disturbance.   Respiratory: Negative for cough and shortness of breath.    Cardiovascular: Negative for chest pain and palpitations.   Gastrointestinal: Negative for abdominal distention, constipation, diarrhea, nausea and vomiting.   Genitourinary: Negative for dysuria, hematuria and urgency.   Musculoskeletal: Positive for arthralgias, back pain and gait problem. Negative for joint swelling and myalgias.   Skin: Negative for color change and rash.  "  Neurological: Negative for dizziness, weakness, light-headedness and confusion.   Hematological: Negative for adenopathy. Does not bruise/bleed easily.   Psychiatric/Behavioral: Negative for agitation and dysphoric mood. The patient is not nervous/anxious.         Objective    Vitals:    04/01/22 1039   BP: 120/68   Pulse: 75   Temp: 97.8 °F (36.6 °C)   TempSrc: Infrared   SpO2: 97%   Weight: 95.7 kg (211 lb)   Height: 170.2 cm (67\")   PainSc:   1   PainLoc: Back     BMI Readings from Last 1 Encounters:   04/01/22 33.05 kg/m²   BMI is above normal parameters. Recommendations include: exercise counseling and nutrition counseling    Does the patient have evidence of cognitive impairment? No    Physical Exam  Constitutional:       General: He is not in acute distress.     Appearance: He is well-developed.   HENT:      Nose: Nose normal.   Eyes:      General: No scleral icterus.     Conjunctiva/sclera: Conjunctivae normal.   Neck:      Thyroid: No thyromegaly.      Trachea: No tracheal deviation.   Cardiovascular:      Rate and Rhythm: Normal rate and regular rhythm.      Heart sounds: No murmur heard.    No friction rub.   Pulmonary:      Effort: No respiratory distress.      Breath sounds: No wheezing or rales.   Abdominal:      General: There is no distension.      Palpations: Abdomen is soft. There is no mass.      Tenderness: There is no abdominal tenderness. There is no guarding.   Musculoskeletal:         General: Tenderness present. No deformity. Normal range of motion.   Lymphadenopathy:      Cervical: No cervical adenopathy.   Skin:     General: Skin is warm and dry.      Findings: No erythema or rash.   Neurological:      Mental Status: He is alert and oriented to person, place, and time.      Cranial Nerves: No cranial nerve deficit.      Coordination: Coordination normal.      Deep Tendon Reflexes: Reflexes are normal and symmetric.   Psychiatric:         Behavior: Behavior normal.         Thought " Content: Thought content normal.         Judgment: Judgment normal.                 HEALTH RISK ASSESSMENT    Smoking Status:  Social History     Tobacco Use   Smoking Status Never Smoker   Smokeless Tobacco Never Used     Alcohol Consumption:  Social History     Substance and Sexual Activity   Alcohol Use No     Fall Risk Screen:    PRAVIN Fall Risk Assessment was completed, and patient is at LOW risk for falls.Assessment completed on:4/1/2022    Depression Screening:  PHQ-2/PHQ-9 Depression Screening 4/1/2022   Retired Total Score -   Little Interest or Pleasure in Doing Things 0-->not at all   Feeling Down, Depressed or Hopeless 0-->not at all   PHQ-9: Brief Depression Severity Measure Score 0       Health Habits and Functional and Cognitive Screening:  Functional & Cognitive Status 4/1/2022   Do you have difficulty preparing food and eating? No   Do you have difficulty bathing yourself, getting dressed or grooming yourself? No   Do you have difficulty using the toilet? No   Do you have difficulty moving around from place to place? No   Do you have trouble with steps or getting out of a bed or a chair? No   Current Diet Well Balanced Diet        Current Diet Comment -   Dental Exam Up to date        Dental Exam Comment -   Eye Exam Up to date   Exercise (times per week) 0 times per week   Current Exercises Include No Regular Exercise   Current Exercise Activities Include -   Do you need help using the phone?  No   Are you deaf or do you have serious difficulty hearing?  No   Do you need help with transportation? No   Do you need help shopping? No   Do you need help preparing meals?  No   Do you need help with housework?  No   Do you need help with laundry? No   Do you need help taking your medications? No   Do you need help managing money? No   Do you ever drive or ride in a car without wearing a seat belt? No   Have you felt unusual stress, anger or loneliness in the last month? No   Who do you live with? Spouse    If you need help, do you have trouble finding someone available to you? No   Have you been bothered in the last four weeks by sexual problems? No   Do you have difficulty concentrating, remembering or making decisions? No       Age-appropriate Screening Schedule:  Refer to the list below for future screening recommendations based on patient's age, sex and/or medical conditions. Orders for these recommended tests are listed in the plan section. The patient has been provided with a written plan.    Health Maintenance   Topic Date Due   • TDAP/TD VACCINES (1 - Tdap) Never done   • LIPID PANEL  11/07/2020   • ZOSTER VACCINE (1 of 2) 11/07/2029 (Originally 7/5/2002)   • INFLUENZA VACCINE  08/01/2022              Assessment/Plan   CMS Preventative Services Quick Reference  Risk Factors Identified During Encounter  Chronic Pain   Obesity/Overweight   The above risks/problems have been discussed with the patient.  Follow up actions/plans if indicated are seen below in the Assessment/Plan Section.  Pertinent information has been shared with the patient in the After Visit Summary.    Diagnoses and all orders for this visit:    1. Essential tremor (Primary) has been using propranolol 40 mg as needed only.  Has had episodes where he complained of severe fatigue when he had to do some physical yard work after taking the dose.  He is not taking the long-acting preparation.  Strong family history of intention tremor     2. Nephrolithiasis encouraged to drink enough fluids status post lithotripsy on the left side    3. Chronic midline low back pain without sciatica low back pain without radiation to his legs longstanding.  Not taking any NSAIDs.  Does not do any exercise for this.  Referral for physical therapy check routine x-ray    4. Mixed hyperlipidemia discussed dietary restrictions check lipid profile        Follow Up:   No follow-ups on file.     An After Visit Summary and PPPS were made available to the  patient.

## 2022-05-23 ENCOUNTER — TELEPHONE (OUTPATIENT)
Dept: UROLOGY | Facility: CLINIC | Age: 72
End: 2022-05-23

## 2022-05-23 NOTE — TELEPHONE ENCOUNTER
KRISHNA RUSSELL IS REQUESTING PREVIOUSLY PRESCRIBED PAIN MEDICATION BE SENT IN FOR KIDNEY STONE PAIN. PT STATED HE HAS AN ORAL SURGERY ON 5/24/22 AND DOES NOT WANT TO MISS IT DUE TO KIDNEY STONE PAIN. I COULD NOT FIND THE MEDICATION IN HIS CHART.    PHARMACY: [facility]- [address], [place], [place].

## 2022-05-27 ENCOUNTER — OFFICE VISIT (OUTPATIENT)
Dept: UROLOGY | Facility: CLINIC | Age: 72
End: 2022-05-27

## 2022-05-27 VITALS
DIASTOLIC BLOOD PRESSURE: 82 MMHG | WEIGHT: 211 LBS | SYSTOLIC BLOOD PRESSURE: 130 MMHG | HEIGHT: 67 IN | TEMPERATURE: 97.1 F | OXYGEN SATURATION: 98 % | HEART RATE: 62 BPM | BODY MASS INDEX: 33.12 KG/M2

## 2022-05-27 DIAGNOSIS — Z87.442 PERSONAL HISTORY OF KIDNEY STONES: Primary | ICD-10-CM

## 2022-05-27 LAB
BILIRUB BLD-MCNC: NEGATIVE MG/DL
CLARITY, POC: CLEAR
COLOR UR: YELLOW
EXPIRATION DATE: ABNORMAL
GLUCOSE UR STRIP-MCNC: NEGATIVE MG/DL
KETONES UR QL: NEGATIVE
LEUKOCYTE EST, POC: NEGATIVE
Lab: ABNORMAL
NITRITE UR-MCNC: NEGATIVE MG/ML
PH UR: 6 [PH] (ref 5–8)
PROT UR STRIP-MCNC: NEGATIVE MG/DL
RBC # UR STRIP: ABNORMAL /UL
SP GR UR: 1.01 (ref 1–1.03)
UROBILINOGEN UR QL: NORMAL

## 2022-05-27 PROCEDURE — 51798 US URINE CAPACITY MEASURE: CPT | Performed by: PHYSICIAN ASSISTANT

## 2022-05-27 PROCEDURE — 99213 OFFICE O/P EST LOW 20 MIN: CPT | Performed by: PHYSICIAN ASSISTANT

## 2022-05-27 PROCEDURE — 81003 URINALYSIS AUTO W/O SCOPE: CPT | Performed by: PHYSICIAN ASSISTANT

## 2022-05-27 NOTE — PROGRESS NOTES
Chief Complaint   Patient presents with   • Nephrolithiasis   • Follow-up        HPI  Mr. Rivas is a 71 y.o. male with history of nephrolithiasis s/p left URS/LL April 2021 who presents for follow up.     At this visit, had an episode of RLQ colicky pain, started about 2 weeks ago. Has been intermittent since and sometimes having associated dark discoloration of urine.  The pain is not present today.  He is concerned his symptoms could be the result of a kidney stone as he has a known right intrarenal stone.    IPSS Questionnaire (AUA-7):  Over the past month…    1)  Incomplete Emptying  How often have you had a sensation of not emptying your bladder?  1 - Less than 1 time in 5   2)  Frequency  How often have you had to urinate less than every two hours? 1 - Less than 1 time in 5   3)  Intermittency  How often have you found you stopped and started again several times when you urinated?  1 - Less than 1 time in 5   4) Urgency  How often have you found it difficult to postpone urination?  2 - Less than half the time   5) Weak Stream  How often have you had a weak urinary stream?  1 - Less than 1 time in 5   6) Straining  How often have you had to push or strain to begin urination?  0 - Not at all   7) Nocturia  How many times did you typically get up at night to urinate?  2 - 2 times   Total Score:  8       Quality of life due to urinary symptoms:  If you were to spend the rest of your life with your urinary condition the way it is now, how would you feel about that? 2-Mostly Satisfied   Urine Leakage (Incontinence) 0-No Leakage         Past Medical History:   Diagnosis Date   • Cataract    • Glaucoma    • Kidney stone    • Tremors of nervous system     essential        Past Surgical History:   Procedure Laterality Date   • CATARACT EXTRACTION, BILATERAL  01/02/2020 2018   • COLONOSCOPY N/A 1/13/2020    Procedure: COLONOSCOPY with hot biopsy polypectomies;  Surgeon: Cindy Terry MD;  Location: [facility]  "ENDOSCOPY;  Service: Gastroenterology   • URETEROSCOPY LASER LITHOTRIPSY WITH STENT INSERTION Left 4/7/2021    Procedure: CYSTOSCOPY, URETEROSCOPY LASER LITHOTRIPSY WITH BASKET EXTRACTION OF STONE FRAGMENTS, RETROGRADE PYELOGRAM AND  STENT INSERTION;  Surgeon: Brenton Walker MD;  Location: [facility];  Service: Urology;  Laterality: Left;   • VASECTOMY           Current Outpatient Medications:   •  latanoprost (XALATAN) 0.005 % ophthalmic solution, Administer 1 drop to both eyes Daily., Disp: , Rfl: 3  •  propranolol (INDERAL) 40 MG tablet, Take 1 tablet by mouth 3 (Three) Times a Day As Needed (tremors)., Disp: 60 tablet, Rfl: 2  •  propranolol LA (Inderal LA) 60 MG 24 hr capsule, Take 1 capsule by mouth Daily., Disp: 90 capsule, Rfl: 3  •  timolol (TIMOPTIC) 0.5 % ophthalmic solution, INSTILL 1 DROP IN EACH EYE IN THE MORNING, Disp: , Rfl:      Physical Exam  Visit Vitals  /82 (BP Location: Left arm, Patient Position: Sitting, Cuff Size: Adult)   Pulse 62   Temp 97.1 °F (36.2 °C) (Infrared)   Ht 170.2 cm (67\")   Wt 95.7 kg (211 lb)   SpO2 98%   BMI 33.05 kg/m²       Labs  Brief Urine Lab Results  (Last result in the past 365 days)      Color   Clarity   Blood   Leuk Est   Nitrite   Protein   CREAT   Urine HCG        05/27/22 1027 Yellow   Clear   Trace   Negative   Negative   Negative                 Lab Results   Component Value Date    GLUCOSE 93 04/01/2022    CALCIUM 11.0 (H) 04/01/2022     04/01/2022    K 4.9 04/01/2022    CO2 28.8 04/01/2022     04/01/2022    BUN 13 04/01/2022    CREATININE 0.89 04/01/2022    EGFRIFAFRI 98 01/05/2021    EGFRIFNONA 84 03/24/2021    BCR 14.6 04/01/2022    ANIONGAP 5.4 03/24/2021       Lab Results   Component Value Date    WBC 6.29 03/24/2021    HGB 15.9 03/24/2021    HCT 47.6 03/24/2021    MCV 91.0 03/24/2021     03/24/2021            Lab Results   Component Value Date    PSA 0.639 10/05/2017           PVR  Post-void residual performed with " ultrasound scanner by staff and interpreted by [address]    I have reviewed above labs and imaging.     Assessment  71 y.o. male with history of nephrolithiasis, most recently underwent left URS/LL with Dr. Walker in April 2021.  He is known to have a 13 mm left intrarenal stone, as well as a 4 mm right intrarenal stone.  He has had no imaging since his last CT 13 months ago revealing the same (as well as 7 mm left UVJ stone that was removed with ureteroscopy).  His symptoms are consistent with renal colic and his UA today reveals trace blood.  I believe his right intrarenal stone is in passage and we will obtain updated imaging to evaluate his stone burden.    He is very interested in obtaining a Litholink and discussing dietary modifications to prevent further stone formation.  He drinks 5 to 6 glasses of water per day and no soda.  He is aware that he should limit sodium intake as well, generally speaking, to limit stone formation.    Regarding screening for prostate cancer and BPH intervention, the patient states that his primary care provider is watching his PSA.  However, the last PSA I see for this patient was from 5 years previously.  His IPSS is now moderate with a symptom score of 8.  His PVR is 0.  He is on no medications for his prostate or bladder.    Plan  1.  Obtain CT stone  2.  Obtain Litholink after acute stone is managed  3.  Discuss again with the patient and make sure his PSA is being checked annually as he has no recent results  4.  Follow-up based on CT stone results

## 2022-06-08 ENCOUNTER — HOSPITAL ENCOUNTER (OUTPATIENT)
Dept: CT IMAGING | Facility: HOSPITAL | Age: 72
Discharge: HOME | End: 2022-06-08
Admitting: PHYSICIAN ASSISTANT

## 2022-06-08 DIAGNOSIS — Z87.442 PERSONAL HISTORY OF KIDNEY STONES: ICD-10-CM

## 2022-06-08 PROCEDURE — 74176 CT ABD & PELVIS W/O CONTRAST: CPT

## 2022-06-24 ENCOUNTER — OFFICE VISIT (OUTPATIENT)
Dept: UROLOGY | Facility: CLINIC | Age: 72
End: 2022-06-24

## 2022-06-24 DIAGNOSIS — N20.0 NEPHROLITHIASIS: Primary | ICD-10-CM

## 2022-06-24 PROCEDURE — 99441 PR PHYS/QHP TELEPHONE EVALUATION 5-10 MIN: CPT | Performed by: PHYSICIAN ASSISTANT

## 2022-06-24 NOTE — PROGRESS NOTES
Chief Complaint   Patient presents with   • Nephrolithiasis        HPI  Mr. Rivas is a 71 y.o. male with history of nephrolithiasis who consented to receive follow-up care by telephone for this visit.    At this visit, pain is not present.  He knows that his CT results revealed resolution of right intrarenal stone.  He continues to be interested in Litholink to discuss stone prevention.    Past Medical History:   Diagnosis Date   • Cataract    • Glaucoma    • Kidney stone    • Tremors of nervous system     essential        Past Surgical History:   Procedure Laterality Date   • CATARACT EXTRACTION, BILATERAL  01/02/2020 2018   • COLONOSCOPY N/A 1/13/2020    Procedure: COLONOSCOPY with hot biopsy polypectomies;  Surgeon: Cindy Terry MD;  Location: [facility] ENDOSCOPY;  Service: Gastroenterology   • URETEROSCOPY LASER LITHOTRIPSY WITH STENT INSERTION Left 4/7/2021    Procedure: CYSTOSCOPY, URETEROSCOPY LASER LITHOTRIPSY WITH BASKET EXTRACTION OF STONE FRAGMENTS, RETROGRADE PYELOGRAM AND  STENT INSERTION;  Surgeon: Brenton Walker MD;  Location: [facility] OR;  Service: Urology;  Laterality: Left;   • VASECTOMY           Current Outpatient Medications:   •  latanoprost (XALATAN) 0.005 % ophthalmic solution, Administer 1 drop to both eyes Daily., Disp: , Rfl: 3  •  propranolol (INDERAL) 40 MG tablet, Take 1 tablet by mouth 3 (Three) Times a Day As Needed (tremors)., Disp: 60 tablet, Rfl: 2  •  propranolol LA (Inderal LA) 60 MG 24 hr capsule, Take 1 capsule by mouth Daily., Disp: 90 capsule, Rfl: 3  •  timolol (TIMOPTIC) 0.5 % ophthalmic solution, INSTILL 1 DROP IN EACH EYE IN THE MORNING, Disp: , Rfl:      Physical Exam  There were no vitals taken for this visit.    Labs  Brief Urine Lab Results  (Last result in the past 365 days)      Color   Clarity   Blood   Leuk Est   Nitrite   Protein   CREAT   Urine HCG        05/27/22 1027 Yellow   Clear   Trace   Negative   Negative   Negative                 Lab Results    Component Value Date    GLUCOSE 93 04/01/2022    CALCIUM 11.0 (H) 04/01/2022     04/01/2022    K 4.9 04/01/2022    CO2 28.8 04/01/2022     04/01/2022    BUN 13 04/01/2022    CREATININE 0.89 04/01/2022    EGFRIFAFRI 98 01/05/2021    EGFRIFNONA 84 03/24/2021    BCR 14.6 04/01/2022    ANIONGAP 5.4 03/24/2021       Lab Results   Component Value Date    WBC 6.29 03/24/2021    HGB 15.9 03/24/2021    HCT 47.6 03/24/2021    MCV 91.0 03/24/2021     03/24/2021            Lab Results   Component Value Date    PSA 0.639 10/05/2017       No results found for: TESTOSTERONE         Radiographic Studies  CT Abdomen Pelvis Stone Protocol    Result Date: 6/8/2022  1. Improved right nephrolithiasis since prior exam with minimal left nephrolithiasis. 2. No evidence of obstructing stone or hydronephrosis  This study was performed with techniques to keep radiation doses as low as reasonably achievable (ALARA). Individualized dose reduction techniques using automated exposure control or adjustment of vA and/or kV according to the patient size were employed.  This report was signed and finalized on 6/8/2022 1:00 PM by Dickson Balbuena MD.        Assessment  71 y.o. male with history of nephrolithiasis presenting for follow-up.  He has not had return of symptoms since we last spoke.  His CT from March 2021 revealed a 4 mm right intrarenal stone that is no longer present on our most recent CT.  He would like to discuss stone prevention and obtain a Litholink.  I have again advised that he make sure his primary care provider is checking his PSA, or we would be happy to, as he is within normal range for screening for prostate cancer and he is overdue based on EMR.    Plan  1.  Obtain Litholink  2.  Follow-up with Dr. Walker to discuss results and stone prevention        TOTAL TIME 5 MINUTES

## 2022-08-15 ENCOUNTER — OFFICE VISIT (OUTPATIENT)
Dept: UROLOGY | Facility: CLINIC | Age: 72
End: 2022-08-15

## 2022-08-15 DIAGNOSIS — R82.994 HYPERCALCIURIA: ICD-10-CM

## 2022-08-15 DIAGNOSIS — N20.0 NEPHROLITHIASIS: Primary | ICD-10-CM

## 2022-08-15 PROCEDURE — 99442 PR PHYS/QHP TELEPHONE EVALUATION 11-20 MIN: CPT | Performed by: UROLOGY

## 2022-08-15 NOTE — PROGRESS NOTES
Lab Results   Component Value Date    CALCIUM 11.0 (H) 04/01/2022     15 minute telephone conversation.  I reviewed the patient's Litholink with him, which demonstrates extremely high urine calcium, moderately low urine volume, and borderline high urine sodium.  His serum calcium has also been elevated in the past.  We will therefore check parathyroid hormone and he will follow-up with me in 2 weeks.    You have chosen to receive care through a telephone visit. Do you consent to use a telephone visit for your medical care today? Yes

## 2022-08-18 ENCOUNTER — LAB (OUTPATIENT)
Dept: LAB | Facility: HOSPITAL | Age: 72
End: 2022-08-18

## 2022-08-18 DIAGNOSIS — N20.0 NEPHROLITHIASIS: ICD-10-CM

## 2022-08-18 LAB
CALCIUM SPEC-SCNC: 10.2 MG/DL (ref 8.6–10.5)
PTH-INTACT SERPL-MCNC: 130 PG/ML (ref 15–65)

## 2022-08-18 PROCEDURE — 83970 ASSAY OF PARATHORMONE: CPT

## 2022-08-18 PROCEDURE — 82310 ASSAY OF CALCIUM: CPT

## 2022-08-18 PROCEDURE — 36415 COLL VENOUS BLD VENIPUNCTURE: CPT

## 2022-08-25 ENCOUNTER — OFFICE VISIT (OUTPATIENT)
Dept: UROLOGY | Facility: CLINIC | Age: 72
End: 2022-08-25

## 2022-08-25 VITALS
OXYGEN SATURATION: 97 % | BODY MASS INDEX: 33.12 KG/M2 | SYSTOLIC BLOOD PRESSURE: 136 MMHG | HEART RATE: 65 BPM | WEIGHT: 211 LBS | DIASTOLIC BLOOD PRESSURE: 80 MMHG | HEIGHT: 67 IN | TEMPERATURE: 97.7 F

## 2022-08-25 DIAGNOSIS — N20.0 NEPHROLITHIASIS: Primary | ICD-10-CM

## 2022-08-25 PROCEDURE — 99213 OFFICE O/P EST LOW 20 MIN: CPT | Performed by: UROLOGY

## 2022-08-25 NOTE — PROGRESS NOTES
"Chief Complaint   Patient presents with   • Follow-up     2 week follow up, discuss labs and litholink.       HPI  Ms. Rivas is a 72 y.o. male with history below in assessment, who presents for follow up.     At this visit patient presents to discuss labs on follow-up    Past Medical History:   Diagnosis Date   • Cataract    • Glaucoma    • Kidney stone    • Tremors of nervous system     essential        Past Surgical History:   Procedure Laterality Date   • CATARACT EXTRACTION, BILATERAL  01/02/2020 2018   • COLONOSCOPY N/A 1/13/2020    Procedure: COLONOSCOPY with hot biopsy polypectomies;  Surgeon: Cindy Terry MD;  Location: [facility] ENDOSCOPY;  Service: Gastroenterology   • URETEROSCOPY LASER LITHOTRIPSY WITH STENT INSERTION Left 4/7/2021    Procedure: CYSTOSCOPY, URETEROSCOPY LASER LITHOTRIPSY WITH BASKET EXTRACTION OF STONE FRAGMENTS, RETROGRADE PYELOGRAM AND  STENT INSERTION;  Surgeon: Brenton Walker MD;  Location: [facility] OR;  Service: Urology;  Laterality: Left;   • VASECTOMY           Current Outpatient Medications:   •  latanoprost (XALATAN) 0.005 % ophthalmic solution, Administer 1 drop to both eyes Daily., Disp: , Rfl: 3  •  propranolol (INDERAL) 40 MG tablet, Take 1 tablet by mouth 3 (Three) Times a Day As Needed (tremors)., Disp: 60 tablet, Rfl: 2  •  propranolol LA (Inderal LA) 60 MG 24 hr capsule, Take 1 capsule by mouth Daily., Disp: 90 capsule, Rfl: 3  •  timolol (TIMOPTIC) 0.5 % ophthalmic solution, INSTILL 1 DROP IN EACH EYE IN THE MORNING, Disp: , Rfl:      Physical Exam  Visit Vitals  /80 (BP Location: Left arm, Patient Position: Sitting, Cuff Size: Adult)   Pulse 65   Temp 97.7 °F (36.5 °C) (Temporal)   Ht 170.2 cm (67\")   Wt 95.7 kg (211 lb)   SpO2 97%   BMI 33.05 kg/m²       Labs  Brief Urine Lab Results  (Last result in the past 365 days)      Color   Clarity   Blood   Leuk Est   Nitrite   Protein   CREAT   Urine HCG        05/27/22 1027 Yellow   Clear   Trace   " Negative   Negative   Negative                 Lab Results   Component Value Date    GLUCOSE 93 04/01/2022    CALCIUM 10.2 08/18/2022     04/01/2022    K 4.9 04/01/2022    CO2 28.8 04/01/2022     04/01/2022    BUN 13 04/01/2022    CREATININE 0.89 04/01/2022    EGFRIFAFRI 98 01/05/2021    EGFRIFNONA 84 03/24/2021    BCR 14.6 04/01/2022    ANIONGAP 5.4 03/24/2021       Lab Results   Component Value Date    WBC 6.29 03/24/2021    HGB 15.9 03/24/2021    HCT 47.6 03/24/2021    MCV 91.0 03/24/2021     03/24/2021     Lab Results   Component Value Date    .0 (H) 08/18/2022    CALCIUM 10.2 08/18/2022          Lab Results   Component Value Date    PSA 0.639 10/05/2017           Radiographic Studies  CT Abdomen Pelvis Stone Protocol  Result Date: 6/8/2022  1. Improved right nephrolithiasis since prior exam with minimal left nephrolithiasis. 2. No evidence of obstructing stone or hydronephrosis  This study was performed with techniques to keep radiation doses as low as reasonably achievable (ALARA). Individualized dose reduction techniques using automated exposure control or adjustment of vA and/or kV according to the patient size were employed.  This report was signed and finalized on 6/8/2022 1:00 PM by Dickson Balbuena MD.        I have reviewed above labs and imaging.     Assessment  72 y.o. male with recurrent calcium oxalate stones, severe hypercalciuria, which was found on 24 urine collection, as well as hypercalcemia and newly discovered hyperparathyroidism on work-up.    Plan  1.  Referral to endocrinology  2.  Vitamin D level

## 2022-08-26 LAB — 1,25(OH)2D SERPL-MCNC: 81.6 PG/ML (ref 24.8–81.5)

## 2022-11-09 ENCOUNTER — OFFICE VISIT (OUTPATIENT)
Dept: INTERNAL MEDICINE | Facility: CLINIC | Age: 72
End: 2022-11-09

## 2022-11-09 VITALS
DIASTOLIC BLOOD PRESSURE: 78 MMHG | WEIGHT: 208 LBS | OXYGEN SATURATION: 99 % | HEART RATE: 71 BPM | BODY MASS INDEX: 32.65 KG/M2 | HEIGHT: 67 IN | SYSTOLIC BLOOD PRESSURE: 110 MMHG | TEMPERATURE: 97.3 F

## 2022-11-09 DIAGNOSIS — M54.6 ACUTE MIDLINE THORACIC BACK PAIN: Primary | ICD-10-CM

## 2022-11-09 DIAGNOSIS — L98.9 CHANGING SKIN LESION: ICD-10-CM

## 2022-11-09 DIAGNOSIS — L57.0 ACTINIC KERATOSES: ICD-10-CM

## 2022-11-09 PROCEDURE — 99213 OFFICE O/P EST LOW 20 MIN: CPT | Performed by: NURSE PRACTITIONER

## 2022-11-09 NOTE — PROGRESS NOTES
Office Visit      Patient Name: Adonay Rivas  : 1950   MRN: [id number]   Care Team: Patient Care Team:  Checo Kidd MD as PCP - General (Internal Medicine)  Brenton Walker MD as Consulting Physician (Urology)    Chief Complaint  Back Pain (3 weeks ago-worsening) and Suspicious Skin Lesion (On back, changed color/Wart like spot on face that he would like to have removed. )    Subjective     Subjective      Adonay Rivas presents to [facility] for thoracic back pain and skin lesion.   Symptoms started several years ago feeling a rush of pain only when he sneezed. They have started worsening over the last 3 weeks.   Movement makes the pain worse. Once he is comfortable at night pain improves and he is able to rest.   Taking a deep breath is painful.   Pain is described as sharp and stabbing,  Comes on quickly and then disappates quickly.   He has tried motrin and it does help some.   Denies dyspnea , chest pain, he has never smoked, recent trauma or injury to the mid back, numbness/tingling into the upper arms/hands, cough, lethargy, and fever.  He is also concerned about several different skin lesions, one being on the face and other on the low back.  Low back lesion is brown in color and changing colors per his significant other.  Lesion on the forehead is raised and dry, he has had several of these removed by dermatology in the past.  No prior history of actual skin cancer.    Review of Systems   Constitutional: Negative for chills, fatigue, fever and unexpected weight loss.   Respiratory: Negative for shortness of breath.    Cardiovascular: Negative for chest pain.   Gastrointestinal: Negative for abdominal pain.   Musculoskeletal: Positive for back pain. Negative for arthralgias, gait problem, joint swelling, myalgias and neck pain.   Skin: Positive for color change and skin lesions. Negative for rash.   Neurological: Negative for numbness,  "headache and confusion.   Psychiatric/Behavioral: Negative for sleep disturbance.       Objective     Objective   Vital Signs:   /78   Pulse 71   Temp 97.3 °F (36.3 °C)   Ht 170.2 cm (67\")   Wt 94.3 kg (208 lb)   SpO2 99%   BMI 32.58 kg/m²     Physical Exam  Vitals and nursing note reviewed.   Constitutional:       General: He is not in acute distress.     Appearance: Normal appearance.   Cardiovascular:      Rate and Rhythm: Normal rate and regular rhythm.      Heart sounds: Normal heart sounds. No murmur heard.  Pulmonary:      Effort: Pulmonary effort is normal.      Breath sounds: Normal breath sounds. No wheezing.   Abdominal:      General: Bowel sounds are normal.      Palpations: Abdomen is soft.      Tenderness: There is no right CVA tenderness or left CVA tenderness.   Musculoskeletal:         General: Deformity present.      Cervical back: Normal and normal range of motion. No muscular tenderness.      Thoracic back: Tenderness present. No swelling or deformity. Normal range of motion. No scoliosis.      Lumbar back: Normal. Negative right straight leg raise test and negative left straight leg raise test.        Back:       Comments:      Skin:     General: Skin is warm and dry.      Findings: Lesion (As marked, forehead lesion is pink with stuck on appearance and dry, no irritation.  Mid back lesion is differing variations of brown with irregular borders and about 8 mm in diameter) present. No erythema or rash.          Neurological:      Mental Status: He is alert.      Motor: No weakness.      Deep Tendon Reflexes: Reflexes normal.   Psychiatric:         Mood and Affect: Mood normal.         Behavior: Behavior normal.          Assessment / Plan      Assessment & Plan   Problem List Items Addressed This Visit    None  Visit Diagnoses     Acute midline thoracic back pain    -  Primary    Suspect myofascial strain, no red flags on history.  Pulmonary exam unremarkable.  Discussed proper " posture, stretching, heat, extra strength Tylenol as needed, and if not improving would recommend PT.  Do not feel like imaging would be beneficial at this time, did discuss red flags with him that would warrant imaging and he verbalized understanding.    Actinic keratoses        Relevant Orders    Ambulatory Referral to Dermatology    Due to dermatology recommendation for back lesion which is suspicious for basal cell he would like to be evaluated by dermatology for removal of AK on forehead.  Sunscreen recommended.  Follow-up with dermatology.    Changing skin lesion               Follow Up   Return if symptoms worsen or fail to improve.  Patient was given instructions and counseling regarding his condition or for health maintenance advice. Please see specific information pulled into the AVS if appropriate.     DEANDRE Riojas  [facility]

## 2022-11-17 ENCOUNTER — OFFICE VISIT (OUTPATIENT)
Dept: ENDOCRINOLOGY | Facility: CLINIC | Age: 72
End: 2022-11-17

## 2022-11-17 ENCOUNTER — LAB (OUTPATIENT)
Dept: LAB | Facility: HOSPITAL | Age: 72
End: 2022-11-17

## 2022-11-17 VITALS
BODY MASS INDEX: 32.8 KG/M2 | SYSTOLIC BLOOD PRESSURE: 114 MMHG | WEIGHT: 209 LBS | HEIGHT: 67 IN | HEART RATE: 60 BPM | DIASTOLIC BLOOD PRESSURE: 80 MMHG | OXYGEN SATURATION: 96 %

## 2022-11-17 DIAGNOSIS — E21.0 PRIMARY HYPERPARATHYROIDISM: Primary | ICD-10-CM

## 2022-11-17 DIAGNOSIS — E21.0 PRIMARY HYPERPARATHYROIDISM: ICD-10-CM

## 2022-11-17 LAB
ALBUMIN SERPL-MCNC: 4.3 G/DL (ref 3.5–5.2)
ANION GAP SERPL CALCULATED.3IONS-SCNC: 5 MMOL/L (ref 5–15)
BUN SERPL-MCNC: 10 MG/DL (ref 8–23)
BUN/CREAT SERPL: 11.8 (ref 7–25)
CALCIUM SPEC-SCNC: 10.8 MG/DL (ref 8.6–10.5)
CHLORIDE SERPL-SCNC: 102 MMOL/L (ref 98–107)
CO2 SERPL-SCNC: 30 MMOL/L (ref 22–29)
CREAT SERPL-MCNC: 0.85 MG/DL (ref 0.76–1.27)
EGFRCR SERPLBLD CKD-EPI 2021: 93.5 ML/MIN/1.73
GLUCOSE SERPL-MCNC: 95 MG/DL (ref 65–99)
PHOSPHATE SERPL-MCNC: 2.1 MG/DL (ref 2.5–4.5)
POTASSIUM SERPL-SCNC: 4.7 MMOL/L (ref 3.5–5.2)
SODIUM SERPL-SCNC: 137 MMOL/L (ref 136–145)

## 2022-11-17 PROCEDURE — 99203 OFFICE O/P NEW LOW 30 MIN: CPT | Performed by: INTERNAL MEDICINE

## 2022-11-17 PROCEDURE — 83970 ASSAY OF PARATHORMONE: CPT

## 2022-11-17 PROCEDURE — 82306 VITAMIN D 25 HYDROXY: CPT

## 2022-11-17 PROCEDURE — 80069 RENAL FUNCTION PANEL: CPT

## 2022-11-17 PROCEDURE — 82652 VIT D 1 25-DIHYDROXY: CPT

## 2022-11-17 NOTE — PROGRESS NOTES
Chief Complaint   Patient presents with   • Abnormal Calcium        New patient who is being seen in consultation regarding hypercalcemia at the request of Brenton Walker MD     HPI   Adonay Rivas is a 72 y.o. male who presents .for evaluation of hypercalcemia.    Patient reports that diagnosis of hyperparathyroidism was made during recent evaluation regarding recurrent kidney stones.  Discussed with patient that per chart review, hypercalcemia has been present intermittently for several years.  Patient reports that further evaluation and previously not been completed.  Patient denies taking calcium supplements or OTC medications containing calcium, such as TUMS.   Patient denies that he is taking vitamin D supplementation.    Patient denies history of hypertension treated with HCTZ or chlorthalidone.  Patient  denies history of kidney dysfunction.  Patient reports history of kidney stones.  Patient reports a history of calcium containing kidney stones.  Patient reports that his sister also had hyperparathyroidism and required surgery to remove multiple glands.  She also had CKD.      Past Medical History:   Diagnosis Date   • Cataract    • Glaucoma    • Hypercalcemia    • Kidney stone    • Tremors of nervous system     essential      Past Surgical History:   Procedure Laterality Date   • CATARACT EXTRACTION, BILATERAL  01/02/2020 2018   • COLONOSCOPY N/A 1/13/2020    Procedure: COLONOSCOPY with hot biopsy polypectomies;  Surgeon: Cindy Terry MD;  Location: [facility];  Service: Gastroenterology   • URETEROSCOPY LASER LITHOTRIPSY WITH STENT INSERTION Left 4/7/2021    Procedure: CYSTOSCOPY, URETEROSCOPY LASER LITHOTRIPSY WITH BASKET EXTRACTION OF STONE FRAGMENTS, RETROGRADE PYELOGRAM AND  STENT INSERTION;  Surgeon: Brenton Walker MD;  Location: [facility] OR;  Service: Urology;  Laterality: Left;   • VASECTOMY        Family History   Problem Relation Age of Onset   • Cancer Mother    •  "Hyperlipidemia Mother    • Kidney disease Mother    • Stroke Mother    • Uterine cancer Mother    • Dementia Father    • Obesity Sister    • Other Sister         RSD   • Cancer Sister    • Thyroid cancer Sister    • Cancer Brother    • Leukemia Brother       Social History     Socioeconomic History   • Marital status:    Tobacco Use   • Smoking status: Never   • Smokeless tobacco: Never   Vaping Use   • Vaping Use: Never used   Substance and Sexual Activity   • Alcohol use: No   • Drug use: No   • Sexual activity: Defer      No Known Allergies   Current Outpatient Medications on File Prior to Visit   Medication Sig Dispense Refill   • latanoprost (XALATAN) 0.005 % ophthalmic solution Administer 1 drop to both eyes Daily.  3   • propranolol (INDERAL) 40 MG tablet Take 1 tablet by mouth 3 (Three) Times a Day As Needed (tremors). 60 tablet 2   • propranolol LA (Inderal LA) 60 MG 24 hr capsule Take 1 capsule by mouth Daily. 90 capsule 3   • timolol (TIMOPTIC) 0.5 % ophthalmic solution INSTILL 1 DROP IN EACH EYE IN THE MORNING       No current facility-administered medications on file prior to visit.        Review of Systems   HENT: Positive for trouble swallowing and voice change (Intermittent).    Genitourinary: Positive for urgency.   Neurological: Positive for tremors.      Vitals:    11/17/22 1109   BP: 114/80   BP Location: Right arm   Patient Position: Sitting   Cuff Size: Adult   Pulse: 60   SpO2: 96%   Weight: 94.8 kg (209 lb)   Height: 170.2 cm (67\")   Body mass index is 32.73 kg/m².     Physical Exam  Vitals reviewed.   Constitutional:       General: He is not in acute distress.     Appearance: Normal appearance.   HENT:      Head: Normocephalic and atraumatic.   Eyes:      General: Lids are normal.      Conjunctiva/sclera: Conjunctivae normal.   Neck:      Thyroid: No thyromegaly or thyroid tenderness.   Cardiovascular:      Rate and Rhythm: Normal rate and regular rhythm.      Heart sounds: No murmur " heard.  Pulmonary:      Effort: Pulmonary effort is normal.      Breath sounds: Normal breath sounds and air entry.   Lymphadenopathy:      Cervical: No cervical adenopathy.   Neurological:      General: No focal deficit present.      Mental Status: He is alert.   Psychiatric:         Mood and Affect: Mood and affect normal.         Behavior: Behavior is cooperative.            Labs/Imaging   Latest Reference Range & Units 07/24/17 10:01 11/07/19 10:36 01/05/21 08:55 03/24/21 10:38 04/01/22 11:37 08/18/22 09:13   Calcium 8.6 - 10.5 mg/dL 10.3 (H) 10.1 10.8 (H) 10.4 11.0 (H) 10.2   (H): Data is abnormally high     Latest Reference Range & Units 08/18/22 09:13 08/25/22 14:11   PTH Intact (Serial Monitor) 15.0 - 65.0 pg/mL 130.0 (H)    1,25-Dihydroxy, Vitamin D 24.8 - 81.5 pg/mL  81.6 (H)   (H): Data is abnormally high    Litholink results reviewed, 24-hour urine calcium 592 mg/day    Assessment and Plan    Diagnoses and all orders for this visit:    1. Primary hyperparathyroidism (HCC) (Primary)  -     Vitamin D,25-Hydroxy; Future  -     Calcitriol (1,25 di-OH Vitamin D); Future  -     Renal Function Panel; Future  -     PTH, Intact; Future  Patient with intermittent calcium elevation dating back to 2017.  Recent evaluation with elevation of PTH.  1,25 dihydroxy vitamin D was drawn on a separate sample but was mildly elevated.  24-hour urine calcium is elevated.  Patient is not currently taking any calcium or vitamin D supplementation.  Discussed that elevation of active vitamin D is likely secondary to hyper conversion due to elevated PTH.  Discussed with patient that I would like to repeat renal function panel, calcium, vitamin D but that prior evaluation is consistent with primary hyperparathyroidism.  Discussed with patient that definitive management of hyperparathyroidism is surgical.  Discussed with patient that surgical intervention will be warranted given history of recurrent nephrolithiasis.  Update labs  today and determine next steps, suspect that patient will need surgical referral.       Return in about 4 months (around 3/17/2023) for Next scheduled follow up. The patient was instructed to contact the clinic with any interval questions or concerns.    Ashley Dillard MD     Dictated Utilizing Dragon Dictation

## 2022-11-18 LAB
25(OH)D3 SERPL-MCNC: 19.5 NG/ML (ref 30–100)
PTH-INTACT SERPL-MCNC: 155 PG/ML (ref 15–65)

## 2022-11-21 LAB — 1,25(OH)2D SERPL-MCNC: 75.4 PG/ML (ref 24.8–81.5)

## 2022-11-22 DIAGNOSIS — E21.0 PRIMARY HYPERPARATHYROIDISM: Primary | ICD-10-CM

## 2022-12-30 ENCOUNTER — TRANSCRIBE ORDERS (OUTPATIENT)
Dept: ADMINISTRATIVE | Facility: HOSPITAL | Age: 72
End: 2022-12-30
Payer: MEDICARE

## 2022-12-30 DIAGNOSIS — E21.0 PRIMARY HYPERPARATHYROIDISM: Primary | ICD-10-CM

## 2023-01-10 ENCOUNTER — HOSPITAL ENCOUNTER (OUTPATIENT)
Dept: NUCLEAR MEDICINE | Facility: HOSPITAL | Age: 73
Discharge: HOME | End: 2023-01-10
Payer: MEDICARE

## 2023-01-10 DIAGNOSIS — E21.0 PRIMARY HYPERPARATHYROIDISM: ICD-10-CM

## 2023-01-10 PROCEDURE — 0 TECHNETIUM SESTAMIBI: Performed by: SURGERY

## 2023-01-10 PROCEDURE — A9500 TC99M SESTAMIBI: HCPCS | Performed by: SURGERY

## 2023-01-10 PROCEDURE — 78070 PARATHYROID PLANAR IMAGING: CPT

## 2023-01-10 RX ADMIN — TECHNETIUM TC 99M SESTAMIBI 1 DOSE: 1 INJECTION INTRAVENOUS at 08:40

## 2023-03-28 ENCOUNTER — PRE-ADMISSION TESTING (OUTPATIENT)
Dept: PREADMISSION TESTING | Facility: HOSPITAL | Age: 73
End: 2023-03-28
Payer: MEDICARE

## 2023-03-28 VITALS — BODY MASS INDEX: 33.01 KG/M2 | HEIGHT: 67 IN | WEIGHT: 210.32 LBS

## 2023-03-28 LAB
DEPRECATED RDW RBC AUTO: 45.2 FL (ref 37–54)
ERYTHROCYTE [DISTWIDTH] IN BLOOD BY AUTOMATED COUNT: 12.8 % (ref 12.3–15.4)
HCT VFR BLD AUTO: 48 % (ref 37.5–51)
HGB BLD-MCNC: 15.4 G/DL (ref 13–17.7)
MCH RBC QN AUTO: 30.7 PG (ref 26.6–33)
MCHC RBC AUTO-ENTMCNC: 32.1 G/DL (ref 31.5–35.7)
MCV RBC AUTO: 95.8 FL (ref 79–97)
PLATELET # BLD AUTO: 206 10*3/MM3 (ref 140–450)
PMV BLD AUTO: 10.7 FL (ref 6–12)
POTASSIUM SERPL-SCNC: 4.7 MMOL/L (ref 3.5–5.2)
RBC # BLD AUTO: 5.01 10*6/MM3 (ref 4.14–5.8)
WBC NRBC COR # BLD: 7.52 10*3/MM3 (ref 3.4–10.8)

## 2023-03-28 PROCEDURE — 85027 COMPLETE CBC AUTOMATED: CPT

## 2023-03-28 PROCEDURE — 36415 COLL VENOUS BLD VENIPUNCTURE: CPT

## 2023-03-28 PROCEDURE — 84132 ASSAY OF SERUM POTASSIUM: CPT

## 2023-03-28 PROCEDURE — 93005 ELECTROCARDIOGRAM TRACING: CPT

## 2023-03-28 PROCEDURE — 93010 ELECTROCARDIOGRAM REPORT: CPT | Performed by: INTERNAL MEDICINE

## 2023-03-28 NOTE — PAT
Patient viewed general PAT education video as instructed in their preoperative information received from their surgeon.  Patient stated the general PAT education video was viewed in its entirety and survey completed.  Copies of [facility] general education handouts (Incentive Spirometry, Meds to Beds Program, Patient Belongings, Pre-op skin preparation instructions, Blood Glucose testing, Visitor policy, Surgery FAQ, Code H) distributed to patient if not printed. Education related to the PAT pass and skin preparation for surgery (if applicable) completed in [facility] as a reinforcement to PAT education video. Patient instructed to return PAT pass provided today as well as completed skin preparation sheet (if applicable) on the day of procedure.     Additionally if patient had not viewed video yet but intended to view it at home or in our waiting area, then referred them to the handout with QR code/link provided during PAT visit.  Instructed patient to complete survey after viewing the video in its entirety.  Encouraged patient/family to read [facility] general education handouts thoroughly and notify PAT staff with any questions or concerns. Patient verbalized understanding of all information and priority content.    An arrival time for procedure was not provided during PAT visit. If patient had any questions or concerns about their arrival time, they were instructed to contact their surgeon/physician.  Additionally, if the patient referred to an arrival time that was acquired from their my chart account, patient was encouraged to verify that time with their surgeon/physician. Arrival times are NOT provided in Pre Admission Testing Department.    Patient to apply Chlorhexadine wipes  to surgical area (as instructed) the night before procedure and the AM of procedure. Wipes provided.    Patient denies any current skin issues.     Post-Surgery Information Instruction Sheet given to patient during Pre-Admission Testing Visit with verbal  instructions to patient to return with PAT PASS on the day of surgery. Additionally, encouraged patient to review the information provided.

## 2023-03-29 LAB
QT INTERVAL: 356 MS
QTC INTERVAL: 364 MS

## 2023-03-30 ENCOUNTER — ANESTHESIA EVENT (OUTPATIENT)
Dept: PERIOP | Facility: HOSPITAL | Age: 73
End: 2023-03-30
Payer: MEDICARE

## 2023-03-30 RX ORDER — SODIUM CHLORIDE 9 MG/ML
40 INJECTION, SOLUTION INTRAVENOUS AS NEEDED
Status: CANCELLED | OUTPATIENT
Start: 2023-03-30

## 2023-03-30 RX ORDER — SODIUM CHLORIDE 0.9 % (FLUSH) 0.9 %
10 SYRINGE (ML) INJECTION EVERY 12 HOURS SCHEDULED
Status: CANCELLED | OUTPATIENT
Start: 2023-03-30

## 2023-03-30 RX ORDER — FAMOTIDINE 10 MG/ML
20 INJECTION, SOLUTION INTRAVENOUS ONCE
Status: CANCELLED | OUTPATIENT
Start: 2023-03-30 | End: 2023-03-30

## 2023-03-30 RX ORDER — SODIUM CHLORIDE 0.9 % (FLUSH) 0.9 %
10 SYRINGE (ML) INJECTION AS NEEDED
Status: CANCELLED | OUTPATIENT
Start: 2023-03-30

## 2023-03-31 ENCOUNTER — HOSPITAL ENCOUNTER (OUTPATIENT)
Facility: HOSPITAL | Age: 73
Discharge: HOME | End: 2023-04-01
Attending: SURGERY | Admitting: SURGERY
Payer: MEDICARE

## 2023-03-31 ENCOUNTER — ANESTHESIA (OUTPATIENT)
Dept: PERIOP | Facility: HOSPITAL | Age: 73
End: 2023-03-31
Payer: MEDICARE

## 2023-03-31 DIAGNOSIS — E21.3 HYPERPARATHYROIDISM: ICD-10-CM

## 2023-03-31 LAB — PTH-INTACT SERPL-MCNC: 166.7 PG/ML (ref 15–65)

## 2023-03-31 PROCEDURE — 25010000002 ONDANSETRON PER 1 MG: Performed by: NURSE ANESTHETIST, CERTIFIED REGISTERED

## 2023-03-31 PROCEDURE — 63710000001 OXYCODONE-ACETAMINOPHEN 5-325 MG TABLET: Performed by: SURGERY

## 2023-03-31 PROCEDURE — G0378 HOSPITAL OBSERVATION PER HR: HCPCS

## 2023-03-31 PROCEDURE — 83970 ASSAY OF PARATHORMONE: CPT | Performed by: SURGERY

## 2023-03-31 PROCEDURE — A9270 NON-COVERED ITEM OR SERVICE: HCPCS | Performed by: SURGERY

## 2023-03-31 PROCEDURE — 25010000002 HYDROMORPHONE PER 4 MG: Performed by: SURGERY

## 2023-03-31 PROCEDURE — 88307 TISSUE EXAM BY PATHOLOGIST: CPT | Performed by: SURGERY

## 2023-03-31 PROCEDURE — 25010000002 DEXAMETHASONE PER 1 MG: Performed by: NURSE ANESTHETIST, CERTIFIED REGISTERED

## 2023-03-31 PROCEDURE — 25010000002 PROPOFOL 10 MG/ML EMULSION: Performed by: NURSE ANESTHETIST, CERTIFIED REGISTERED

## 2023-03-31 PROCEDURE — 25010000002 CEFAZOLIN IN DEXTROSE 2-4 GM/100ML-% SOLUTION: Performed by: SURGERY

## 2023-03-31 PROCEDURE — 60220 PARTIAL REMOVAL OF THYROID: CPT

## 2023-03-31 PROCEDURE — 25010000002 FENTANYL CITRATE (PF) 100 MCG/2ML SOLUTION: Performed by: NURSE ANESTHETIST, CERTIFIED REGISTERED

## 2023-03-31 PROCEDURE — 63710000001 FAMOTIDINE 20 MG TABLET: Performed by: SURGERY

## 2023-03-31 PROCEDURE — 25010000002 FENTANYL CITRATE (PF) 50 MCG/ML SOLUTION

## 2023-03-31 DEVICE — CLIP LIGAT VASC HORIZON TI SM YEL 6CT: Type: IMPLANTABLE DEVICE | Site: PARATHYROID | Status: FUNCTIONAL

## 2023-03-31 RX ORDER — SUCCINYLCHOLINE/SOD CL,ISO/PF 200MG/10ML
SYRINGE (ML) INTRAVENOUS AS NEEDED
Status: DISCONTINUED | OUTPATIENT
Start: 2023-03-31 | End: 2023-03-31 | Stop reason: SURG

## 2023-03-31 RX ORDER — FENTANYL CITRATE 50 UG/ML
INJECTION, SOLUTION INTRAMUSCULAR; INTRAVENOUS AS NEEDED
Status: DISCONTINUED | OUTPATIENT
Start: 2023-03-31 | End: 2023-03-31 | Stop reason: SURG

## 2023-03-31 RX ORDER — CEFAZOLIN SODIUM 2 G/100ML
2 INJECTION, SOLUTION INTRAVENOUS ONCE
Status: COMPLETED | OUTPATIENT
Start: 2023-03-31 | End: 2023-03-31

## 2023-03-31 RX ORDER — MIDAZOLAM HYDROCHLORIDE 1 MG/ML
0.5 INJECTION INTRAMUSCULAR; INTRAVENOUS
Status: DISCONTINUED | OUTPATIENT
Start: 2023-03-31 | End: 2023-03-31 | Stop reason: HOSPADM

## 2023-03-31 RX ORDER — PROPOFOL 10 MG/ML
VIAL (ML) INTRAVENOUS AS NEEDED
Status: DISCONTINUED | OUTPATIENT
Start: 2023-03-31 | End: 2023-03-31 | Stop reason: SURG

## 2023-03-31 RX ORDER — FAMOTIDINE 20 MG/1
20 TABLET, FILM COATED ORAL 2 TIMES DAILY
Status: DISCONTINUED | OUTPATIENT
Start: 2023-03-31 | End: 2023-04-01 | Stop reason: HOSPADM

## 2023-03-31 RX ORDER — SODIUM CHLORIDE, SODIUM LACTATE, POTASSIUM CHLORIDE, CALCIUM CHLORIDE 600; 310; 30; 20 MG/100ML; MG/100ML; MG/100ML; MG/100ML
9 INJECTION, SOLUTION INTRAVENOUS CONTINUOUS
Status: DISCONTINUED | OUTPATIENT
Start: 2023-03-31 | End: 2023-04-01

## 2023-03-31 RX ORDER — DOCUSATE SODIUM 100 MG/1
100 CAPSULE, LIQUID FILLED ORAL 2 TIMES DAILY PRN
Status: DISCONTINUED | OUTPATIENT
Start: 2023-03-31 | End: 2023-04-01 | Stop reason: HOSPADM

## 2023-03-31 RX ORDER — EPHEDRINE SULFATE 50 MG/ML
INJECTION INTRAVENOUS AS NEEDED
Status: DISCONTINUED | OUTPATIENT
Start: 2023-03-31 | End: 2023-03-31 | Stop reason: SURG

## 2023-03-31 RX ORDER — LIDOCAINE HYDROCHLORIDE 10 MG/ML
INJECTION, SOLUTION EPIDURAL; INFILTRATION; INTRACAUDAL; PERINEURAL AS NEEDED
Status: DISCONTINUED | OUTPATIENT
Start: 2023-03-31 | End: 2023-03-31 | Stop reason: SURG

## 2023-03-31 RX ORDER — FENTANYL CITRATE 50 UG/ML
50 INJECTION, SOLUTION INTRAMUSCULAR; INTRAVENOUS
Status: DISCONTINUED | OUTPATIENT
Start: 2023-03-31 | End: 2023-03-31 | Stop reason: HOSPADM

## 2023-03-31 RX ORDER — DEXMEDETOMIDINE HYDROCHLORIDE 100 UG/ML
INJECTION, SOLUTION INTRAVENOUS AS NEEDED
Status: DISCONTINUED | OUTPATIENT
Start: 2023-03-31 | End: 2023-03-31 | Stop reason: SURG

## 2023-03-31 RX ORDER — FENTANYL CITRATE 50 UG/ML
INJECTION, SOLUTION INTRAMUSCULAR; INTRAVENOUS
Status: COMPLETED
Start: 2023-03-31 | End: 2023-03-31

## 2023-03-31 RX ORDER — OXYCODONE HYDROCHLORIDE AND ACETAMINOPHEN 5; 325 MG/1; MG/1
1 TABLET ORAL EVERY 4 HOURS PRN
Status: DISCONTINUED | OUTPATIENT
Start: 2023-03-31 | End: 2023-04-01 | Stop reason: HOSPADM

## 2023-03-31 RX ORDER — ONDANSETRON 4 MG/1
4 TABLET, FILM COATED ORAL EVERY 6 HOURS PRN
Status: DISCONTINUED | OUTPATIENT
Start: 2023-03-31 | End: 2023-04-01 | Stop reason: HOSPADM

## 2023-03-31 RX ORDER — NALOXONE HCL 0.4 MG/ML
0.1 VIAL (ML) INJECTION
Status: DISCONTINUED | OUTPATIENT
Start: 2023-03-31 | End: 2023-04-01 | Stop reason: HOSPADM

## 2023-03-31 RX ORDER — HYDROMORPHONE HYDROCHLORIDE 1 MG/ML
0.5 INJECTION, SOLUTION INTRAMUSCULAR; INTRAVENOUS; SUBCUTANEOUS
Status: DISCONTINUED | OUTPATIENT
Start: 2023-03-31 | End: 2023-04-01 | Stop reason: HOSPADM

## 2023-03-31 RX ORDER — PHENYLEPHRINE HCL IN 0.9% NACL 1 MG/10 ML
SYRINGE (ML) INTRAVENOUS AS NEEDED
Status: DISCONTINUED | OUTPATIENT
Start: 2023-03-31 | End: 2023-03-31 | Stop reason: SURG

## 2023-03-31 RX ORDER — LEVOTHYROXINE SODIUM 0.05 MG/1
50 TABLET ORAL
Status: DISCONTINUED | OUTPATIENT
Start: 2023-04-01 | End: 2023-04-01 | Stop reason: HOSPADM

## 2023-03-31 RX ORDER — ONDANSETRON 2 MG/ML
4 INJECTION INTRAMUSCULAR; INTRAVENOUS EVERY 6 HOURS PRN
Status: DISCONTINUED | OUTPATIENT
Start: 2023-03-31 | End: 2023-04-01 | Stop reason: HOSPADM

## 2023-03-31 RX ORDER — LIDOCAINE HYDROCHLORIDE 10 MG/ML
0.5 INJECTION, SOLUTION EPIDURAL; INFILTRATION; INTRACAUDAL; PERINEURAL ONCE AS NEEDED
Status: COMPLETED | OUTPATIENT
Start: 2023-03-31 | End: 2023-03-31

## 2023-03-31 RX ORDER — FAMOTIDINE 20 MG/1
20 TABLET, FILM COATED ORAL ONCE
Status: COMPLETED | OUTPATIENT
Start: 2023-03-31 | End: 2023-03-31

## 2023-03-31 RX ORDER — ONDANSETRON 2 MG/ML
INJECTION INTRAMUSCULAR; INTRAVENOUS AS NEEDED
Status: DISCONTINUED | OUTPATIENT
Start: 2023-03-31 | End: 2023-03-31 | Stop reason: SURG

## 2023-03-31 RX ORDER — DEXAMETHASONE SODIUM PHOSPHATE 4 MG/ML
INJECTION, SOLUTION INTRA-ARTICULAR; INTRALESIONAL; INTRAMUSCULAR; INTRAVENOUS; SOFT TISSUE AS NEEDED
Status: DISCONTINUED | OUTPATIENT
Start: 2023-03-31 | End: 2023-03-31 | Stop reason: SURG

## 2023-03-31 RX ADMIN — LIDOCAINE HYDROCHLORIDE 0.5 ML: 10 INJECTION, SOLUTION EPIDURAL; INFILTRATION; INTRACAUDAL; PERINEURAL at 08:45

## 2023-03-31 RX ADMIN — PROPOFOL 25 MCG/KG/MIN: 10 INJECTION, EMULSION INTRAVENOUS at 11:05

## 2023-03-31 RX ADMIN — EPHEDRINE SULFATE 10 MG: 50 INJECTION INTRAVENOUS at 11:30

## 2023-03-31 RX ADMIN — PROPOFOL 50 MG: 10 INJECTION, EMULSION INTRAVENOUS at 11:09

## 2023-03-31 RX ADMIN — SODIUM CHLORIDE, POTASSIUM CHLORIDE, SODIUM LACTATE AND CALCIUM CHLORIDE: 600; 310; 30; 20 INJECTION, SOLUTION INTRAVENOUS at 12:35

## 2023-03-31 RX ADMIN — Medication 100 MCG: at 11:16

## 2023-03-31 RX ADMIN — Medication 100 MCG: at 11:52

## 2023-03-31 RX ADMIN — DEXAMETHASONE SODIUM PHOSPHATE 8 MG: 4 INJECTION, SOLUTION INTRAMUSCULAR; INTRAVENOUS at 10:30

## 2023-03-31 RX ADMIN — FENTANYL CITRATE 50 MCG: 50 INJECTION, SOLUTION INTRAMUSCULAR; INTRAVENOUS at 14:30

## 2023-03-31 RX ADMIN — Medication 100 MG: at 10:30

## 2023-03-31 RX ADMIN — ONDANSETRON 4 MG: 2 INJECTION INTRAMUSCULAR; INTRAVENOUS at 10:30

## 2023-03-31 RX ADMIN — OXYCODONE HYDROCHLORIDE AND ACETAMINOPHEN 1 TABLET: 5; 325 TABLET ORAL at 16:43

## 2023-03-31 RX ADMIN — DEXMEDETOMIDINE HYDROCHLORIDE 8 MCG: 100 INJECTION, SOLUTION INTRAVENOUS at 10:30

## 2023-03-31 RX ADMIN — Medication 100 MCG: at 11:33

## 2023-03-31 RX ADMIN — EPHEDRINE SULFATE 10 MG: 50 INJECTION INTRAVENOUS at 11:58

## 2023-03-31 RX ADMIN — FENTANYL CITRATE 50 MCG: 50 INJECTION, SOLUTION INTRAMUSCULAR; INTRAVENOUS at 12:29

## 2023-03-31 RX ADMIN — DEXMEDETOMIDINE HYDROCHLORIDE 4 MCG: 100 INJECTION, SOLUTION INTRAVENOUS at 11:25

## 2023-03-31 RX ADMIN — DEXMEDETOMIDINE HYDROCHLORIDE 8 MCG: 100 INJECTION, SOLUTION INTRAVENOUS at 11:09

## 2023-03-31 RX ADMIN — Medication 200 MCG: at 10:45

## 2023-03-31 RX ADMIN — Medication 100 MCG: at 12:46

## 2023-03-31 RX ADMIN — DEXMEDETOMIDINE HYDROCHLORIDE 8 MCG: 100 INJECTION, SOLUTION INTRAVENOUS at 11:41

## 2023-03-31 RX ADMIN — Medication 100 MCG: at 12:04

## 2023-03-31 RX ADMIN — HYDROMORPHONE HYDROCHLORIDE 0.5 MG: 1 INJECTION, SOLUTION INTRAMUSCULAR; INTRAVENOUS; SUBCUTANEOUS at 15:16

## 2023-03-31 RX ADMIN — PROPOFOL 200 MG: 10 INJECTION, EMULSION INTRAVENOUS at 10:30

## 2023-03-31 RX ADMIN — Medication 100 MCG: at 10:39

## 2023-03-31 RX ADMIN — FENTANYL CITRATE 100 MCG: 50 INJECTION, SOLUTION INTRAMUSCULAR; INTRAVENOUS at 10:30

## 2023-03-31 RX ADMIN — Medication 200 MCG: at 12:25

## 2023-03-31 RX ADMIN — PROPOFOL 100 MG: 10 INJECTION, EMULSION INTRAVENOUS at 11:40

## 2023-03-31 RX ADMIN — EPHEDRINE SULFATE 10 MG: 50 INJECTION INTRAVENOUS at 10:50

## 2023-03-31 RX ADMIN — SODIUM CHLORIDE, POTASSIUM CHLORIDE, SODIUM LACTATE AND CALCIUM CHLORIDE 9 ML/HR: 600; 310; 30; 20 INJECTION, SOLUTION INTRAVENOUS at 08:45

## 2023-03-31 RX ADMIN — Medication 100 MCG: at 11:07

## 2023-03-31 RX ADMIN — FENTANYL CITRATE 25 MCG: 50 INJECTION, SOLUTION INTRAMUSCULAR; INTRAVENOUS at 12:40

## 2023-03-31 RX ADMIN — DEXMEDETOMIDINE HYDROCHLORIDE 8 MCG: 100 INJECTION, SOLUTION INTRAVENOUS at 11:00

## 2023-03-31 RX ADMIN — FAMOTIDINE 20 MG: 20 TABLET ORAL at 08:53

## 2023-03-31 RX ADMIN — DEXMEDETOMIDINE HYDROCHLORIDE 4 MCG: 100 INJECTION, SOLUTION INTRAVENOUS at 10:50

## 2023-03-31 RX ADMIN — CEFAZOLIN SODIUM 2 G: 2 INJECTION, SOLUTION INTRAVENOUS at 10:35

## 2023-03-31 RX ADMIN — FENTANYL CITRATE 25 MCG: 50 INJECTION, SOLUTION INTRAMUSCULAR; INTRAVENOUS at 11:45

## 2023-03-31 RX ADMIN — FAMOTIDINE 20 MG: 20 TABLET, FILM COATED ORAL at 20:00

## 2023-03-31 RX ADMIN — LIDOCAINE HYDROCHLORIDE 100 MG: 10 INJECTION, SOLUTION EPIDURAL; INFILTRATION; INTRACAUDAL; PERINEURAL at 10:30

## 2023-03-31 NOTE — PLAN OF CARE
Goal Outcome Evaluation:  Plan of Care Reviewed With: patient, spouse        Progress: improving  Outcome Evaluation: VSS on room air. Discomfort in throat, spray provided. Ice pack on neck incision. PO pain meds given x 2. Adequate oral intake with adequate UOP. Pt up with stand by. Family at bedside. Tolerating liquid diet well, will advance to full. Patent IV in place, saline locked. Bilat senquentail devices in place. Continue POC and report as needed.

## 2023-03-31 NOTE — ANESTHESIA POSTPROCEDURE EVALUATION
Patient: Adonay Rivas    Procedure Summary     Date: 03/31/23 Room / [facility]:  [facility]    Anesthesia Start: 1025 Anesthesia Stop: 1323    Procedure: right thyroid lobectomy (Neck) Diagnosis:       Hyperparathyroidism, primary (HCC)      Right thyroid nodule    Surgeons: Mane Pedro MD Provider: Yonas Pina MD    Anesthesia Type: general ASA Status: 2          Anesthesia Type: general    Vitals  Vitals Value Taken Time   /61 03/31/23 1320   Temp 98.3 °F (36.8 °C) 03/31/23 1320   Pulse 73 03/31/23 1322   Resp 14 03/31/23 1320   SpO2 96 % 03/31/23 1322   Vitals shown include unvalidated device data.        Post Anesthesia Care and Evaluation    Patient location during evaluation: PACU  Patient participation: complete - patient participated  Level of consciousness: sleepy but conscious  Pain score: 0  Pain management: adequate    Airway patency: patent  Anesthetic complications: No anesthetic complications  PONV Status: none  Cardiovascular status: hemodynamically stable and acceptable  Respiratory status: nonlabored ventilation, acceptable, nasal cannula and oral airway  Hydration status: acceptable

## 2023-03-31 NOTE — OP NOTE
General Surgery Operative Note    PARATHYROIDECTOMY  Procedure Report    Patient Name:  Adonay Rivas  YOB: 1950  [id number]     Date of Surgery:  3/31/2023       Pre-op Diagnosis:   1. Hyperparathyroidism  2. Right thyroid nodule    Post-op Diagnosis:    1. Hyperparathyroidism  2. Right thyroid nodule    Procedure(s):  right thyroid lobectomy    Surgeon: Mane Pedro MD    Assistant: Dustin Flannery PA-C     Anesthesia: General         Estimated Blood Loss: minimal    Complications: None     Implants:    Implant Name Type Inv. Item Serial No.  Lot No. LRB No. Used Action   CLIP LIGAT VASC HORIZON TI SM YEL 6CT - [id number] Implant CLIP LIGAT VASC HORIZON TI SM YEL 6CT  [employer] [id number] N/A 2 Implanted       Specimen:          Specimens     ID Source Type Tests Collected By Collected At Frozen?    A Thyroid Tissue · TISSUE PATHOLOGY EXAM   Mane Pedro MD 3/31/23 1221 No    Description: right thyroid lobe    This specimen was not marked as sent.              Findings:  Large right thyroid lobe nodule; no enlarged parathyroid glands elsewhere in the central neck     Indications: The patient is a 72-year-old male with a history of increased serum calcium level.  Further work-up showed an elevated PTH level consistent with primary hyperparathyroidism.  Localizing work-up was negative.  However during work-up the patient was noted to have a right thyroid nodule greater than 4 cm.  Discussions were made with the patient about various treatment options and the patient was found to be agreeable to parathyroidectomy with possible right thyroid lobectomy.  Informed consent was obtained.    Description of Procedure:     After obtaining informed consent, the patient was taken to the operating room and placed in supine position. After appropriate DVT and antibiotic prophylaxis, general anesthesia was induced with placement of a NIM tube for nerve monitoring. The neck was  prepped and draped in standard sterile fashion.    An approximately 7 cm incision was made 2 fingerbreadths superior to the suprasternal notch transversely across the midline neck.  The skin was incised using a 15 blade.  The dermis and subcutaneous tissue were divided using Bovie electrocautery.  The platysma was dissected out and divided using Bovie electrocautery.  Subplatysmal flaps were created superiorly to the thyroid cartilage, inferiorly to the sternal notch, and laterally to the sternocleidomastoid muscles.  The strap muscles were divided in the midline at the median raphae.  Attention was first paid to the left neck.  The strap muscles were dissected off the anterior and lateral surfaces of the thyroid lobe using Bovie electrocautery.  Babcocks were placed on the thyroid lobe and it was retracted anteriorly and medially.  The soft tissue posterior and inferior to the thyroid lobe was inspected laterally to the carotid artery, posteriorly to the esophagus, and inferiorly to the sternal notch and no abnormally enlarged parathyroid tissue was noted.    Attention was then paid to the right neck. The strap muscles were dissected off the anterior and lateral surfaces of the thyroid lobe using Bovie electrocautery.  Babcocks were placed on the thyroid lobe and it was retracted anteriorly and medially.  Superior thyroid vessels were ligated with combination of clips and Harmonic to allow for retraction of the thyroid lobe given the large size of the nodule within the lobe. The soft tissue posterior and inferior to the thyroid lobe was inspected laterally to the carotid artery, posteriorly to the paraspinal tissue, and inferiorly to the sternal notch and no abnormally enlarged parathyroid tissue was noted.  The decision was made at this point to perform a right thyroid lobectomy.  The recurrent laryngeal nerve was identified by both nerve stimulation and visualization.  Soft tissues around this area were dissected  using bipolar electrocautery and small vessels ligated with clips.  Once the thyroid was dissected away from the area of the nerve the remainder the thyroid lobe was dissected off the anterior surface of the trachea using bipolar cautery.  Inferior thyroid vessels were ligated with combination of clips and Harmonic. The specimen was then removed from the body and sent to pathology as a permanent specimen.    Valsalva maneuver was performed by anesthesia to assess for any further bleeding and any further bleeding was controlled using placement of clips.  After hemostasis was obtained both the left and right recurrent laryngeal nerves were grossly intact throughout their entire visualized course and stimulated appropriately with nerve stimulation.  Surgicel was placed in all areas of dissection.  The strap muscles were loosely reapproximated using interrupted 3-0 Vicryl.  The platysma was reapproximated using interrupted 3-0 Vicryl.  The skin was reapproximated using a running subcuticular 4-0 Monocryl.  A dry dressing was placed on top of this.  The patient awoke from anesthesia without complications and was taken to the PACU in stable condition.          Assistant: Dustin Flannery PA-C  was responsible for performing the following activities: Retraction, Suction, Suturing, Closing and Placing Dressing and their skilled assistance was necessary for the success of this case.    Mane Pedro MD     Date: 3/31/2023  Time: 15:00 EDT

## 2023-03-31 NOTE — ANESTHESIA PREPROCEDURE EVALUATION
Anesthesia Evaluation     Patient summary reviewed and Nursing notes reviewed                Airway   Mallampati: II  TM distance: >3 FB  Neck ROM: full  No difficulty expected  Dental - normal exam     Pulmonary - negative pulmonary ROS and normal exam   Cardiovascular - negative cardio ROS and normal exam        Neuro/Psych  (+) tremors,    GI/Hepatic/Renal/Endo    (+) obesity,   renal disease stones,     Musculoskeletal     (+) back pain,   Abdominal  - normal exam    Bowel sounds: normal.   Substance History - negative use     OB/GYN negative ob/gyn ROS         Other          Other Comment: GLAUCOMA                  Anesthesia Plan    ASA 2     general     intravenous induction     Anesthetic plan, risks, benefits, and alternatives have been provided, discussed and informed consent has been obtained with: patient.    Plan discussed with CRNA.        CODE STATUS:

## 2023-03-31 NOTE — H&P
Pre-Op H&P  Adonay Rivas  [id number]  1950      Chief complaint: hyperparathyroid       Subjective:  Patient is a 72 y.o.male presents for scheduled surgery by Dr. Pedro. He anticipates a PARATHYROIDECTOMY today. He is followed by endocrinology for hyperparathyroidism. He reports history of kidney stones. He denies bone fractures. On 11/17/22 Calcium 10.8, .      Review of Systems:  Constitutional-- No fever, chills or sweats. No fatigue.  CV-- No chest pain, palpitation or syncope  Resp-- No SOB, cough, hemoptysis  Skin--No rashes or lesions      Allergies: No Known Allergies      Home Meds:  Medications Prior to Admission   Medication Sig Dispense Refill Last Dose   • latanoprost (XALATAN) 0.005 % ophthalmic solution Administer 1 drop to both eyes Daily. In am  3 3/31/2023 at 0530   • timolol (TIMOPTIC) 0.5 % ophthalmic solution Administer 1 drop to both eyes Daily. In PM   3/30/2023 at 2100   • propranolol (INDERAL) 40 MG tablet Take 1 tablet by mouth 3 (Three) Times a Day As Needed (tremors). 60 tablet 2 3/28/2023         PMH:   Past Medical History:   Diagnosis Date   • Cataract    • Glaucoma    • Hypercalcemia     Related to Parathyroid   • Kidney stone    • Tremors of nervous system     essential    • Wears glasses      PSH:    Past Surgical History:   Procedure Laterality Date   • CATARACT EXTRACTION, BILATERAL  01/02/2020 2018   • COLONOSCOPY N/A 01/13/2020    Procedure: COLONOSCOPY with hot biopsy polypectomies;  Surgeon: Cindy Terry MD;  Location: [facility] ENDOSCOPY;  Service: Gastroenterology   • TEETH EXTRACTION     • URETEROSCOPY LASER LITHOTRIPSY WITH STENT INSERTION Left 04/07/2021    Procedure: CYSTOSCOPY, URETEROSCOPY LASER LITHOTRIPSY WITH BASKET EXTRACTION OF STONE FRAGMENTS, RETROGRADE PYELOGRAM AND  STENT INSERTION;  Surgeon: Brenton Walker MD;  Location: [facility] OR;  Service: Urology;  Laterality: Left;   • VASECTOMY         Immunization History:  Influenza:  "2022  Pneumococcal: UTD  Tetanus: UTD  Covid : x3    Social History:   Tobacco:   Social History     Tobacco Use   Smoking Status Never   Smokeless Tobacco Never      Alcohol:     Social History     Substance and Sexual Activity   Alcohol Use No         Physical Exam:/81 (BP Location: Right arm, Patient Position: Lying)   Pulse 75   Temp 97.3 °F (36.3 °C) (Temporal)   Resp 16   Ht 170.2 cm (67\")   Wt 95.3 kg (210 lb)   SpO2 98%   BMI 32.89 kg/m²       General Appearance:    Alert, cooperative, no distress, appears stated age   Head:    Normocephalic, without obvious abnormality, atraumatic   Lungs:     Clear to auscultation bilaterally, respirations unlabored    Heart:   Regular rate and rhythm, S1 and S2 normal    Abdomen:    Soft without tenderness   Extremities:   Extremities normal, atraumatic, no cyanosis or edema   Skin:   Skin color, texture, turgor normal, no rashes or lesions   Neurologic:   Grossly intact     Results Review:     LABS:  Lab Results   Component Value Date    WBC 7.52 03/28/2023    HGB 15.4 03/28/2023    HCT 48.0 03/28/2023    MCV 95.8 03/28/2023     03/28/2023    GLUCOSE 95 11/17/2022    BUN 10 11/17/2022    CREATININE 0.85 11/17/2022    EGFRIFNONA 84 03/24/2021    EGFRIFAFRI 98 01/05/2021     11/17/2022    K 4.7 03/28/2023     11/17/2022    CO2 30.0 (H) 11/17/2022    PHOS 2.1 (L) 11/17/2022    CALCIUM 10.8 (H) 11/17/2022    ALBUMIN 4.30 11/17/2022    AST 17 04/01/2022    ALT 18 04/01/2022    BILITOT 1.2 04/01/2022      Latest Reference Range & Units 11/17/22 12:00   PTH Intact (Serial Monitor) 15.0 - 65.0 pg/mL 155.0 (H)   (H): Data is abnormally high    RADIOLOGY:  1/10/23 parathyroid scan:  FINDINGS:  There is prominent thyroid uptake on the immediate imaging, most pronounced in the right thyroid lobe. This becomes relatively ill-defined, but persistent on the delayed imaging. There is no focal uptake to suggest a parathyroid adenoma.      IMPRESSION:  No " evidence of parathyroid adenoma    I reviewed the patient's new clinical results.    Cancer Staging (if applicable)  Cancer Patient: __ yes __no __unknown; If yes, clinical stage T:__ N:__M:__, stage group or __N/A      Impression: Primary hyperparathyroidism       Plan: PARATHYROIDECTOMY      DEANDRE Witt   3/31/2023   09:02 EDT

## 2023-03-31 NOTE — ANESTHESIA PROCEDURE NOTES
Airway  Urgency: elective    Date/Time: 3/31/2023 10:31 AM  Airway not difficult    General Information and Staff    Patient location during procedure: OR  CRNA/CAA: Noelle Daniel CRNA    Indications and Patient Condition  Indications for airway management: airway protection    Preoxygenated: yes  MILS not maintained throughout  Mask difficulty assessment: 1 - vent by mask    Final Airway Details  Final airway type: endotracheal airway      Successful airway: ETT and NIM tube  Cuffed: yes   Successful intubation technique: video laryngoscopy  Facilitating devices/methods: intubating stylet  Endotracheal tube insertion site: oral  Blade: Xiong  Blade size: 3  ETT size (mm): 8.0  Cormack-Lehane Classification: grade I - full view of glottis  Placement verified by: chest auscultation and capnometry   Measured from: lips  ETT/EBT  to lips (cm): 22  Number of attempts at approach: 1  Assessment: lips, teeth, and gum same as pre-op and atraumatic intubation    Additional Comments  Negative epigastric sounds, Breath sound equal bilaterally with symmetric chest rise and fall

## 2023-03-31 NOTE — BRIEF OP NOTE
PARATHYROIDECTOMY  Progress Note    Adonay Rivas  3/31/2023    Pre-op Diagnosis:    * Hyperparathyroidism, primary (HCC) [E21.0]     * Right thyroid nodule [E04.1]         Post-Op Diagnosis Codes:     * Hyperparathyroidism, primary (HCC) [E21.0]     * Right thyroid nodule [E04.1]    Procedure/CPT® Codes:        Procedure(s):  right thyroid lobectomy        Surgeon(s):  Mane Pedro MD    Anesthesia: General    Staff:   Circulator: Wm Cameron RN; Crystal Villanueva RN; Margret Patel RN  Scrub Person: Washington Campos  Nursing Assistant: Celina Delgadillo PCT  Assistant: Dustin Flannery PA-C  Assistant: Dustin Flannery PA-C      Estimated Blood Loss: minimal    Urine Voided: * No values recorded between 3/31/2023 10:26 AM and 3/31/2023 12:50 PM *    Specimens:                Specimens     ID Source Type Tests Collected By Collected At Frozen?    A Thyroid Tissue · TISSUE PATHOLOGY EXAM   Mane Pedro MD 3/31/23 1221     Description: right thyroid lobe    This specimen was not marked as sent.                Drains:   [REMOVED] Ureteral Drain/Stent Left ureter 6 Fr. (Removed)       Findings: Large right thyroid lobe nodule; no enlarged parathyroid glands elsewhere in the central neck         Complications: None    Assistant: Dustin Flannery PA-C  was responsible for performing the following activities: Retraction, Suction, Suturing, Closing and Placing Dressing and their skilled assistance was necessary for the success of this case.    Mane Pedro MD     Date: 3/31/2023  Time: 12:57 EDT

## 2023-03-31 NOTE — PROGRESS NOTES
"Patient Name:  Adonay Rivas  YOB: 1950  [id number]    Surgery Post - Operative Note    Date of visit: 3/31/2023    Subjective   Subjective: Feels OK, voice OK, pain controlled.       Objective     Objective:    /92 (BP Location: Left arm, Patient Position: Lying)   Pulse 67   Temp 97.6 °F (36.4 °C) (Temporal)   Resp 17   Ht 170.2 cm (67\")   Wt 95.3 kg (210 lb)   SpO2 96%   BMI 32.89 kg/m²     CV:  Rate  regular and rhythm  regular  L:  Clear  to auscultation bilaterally   NECK:  Soft, appropriately tender. Dressings  clean, dry and intact , no hematoma, voice OK  EXT:  No cyanosis, clubbing or edema             Assessment/ Plan: Doing well after R thyroid lobectomy and parathyroidectomy. Continue Pulmonary toilet    Problem List Items Addressed This Visit        Endocrine and Metabolic    * (Principal) Hyperparathyroidism (HCC)    Relevant Orders    Tissue Pathology Exam        Active Hospital Problems    Diagnosis  POA   • **Hyperparathyroidism (HCC) [E21.3]  Yes      Resolved Hospital Problems   No resolved problems to display.            Jakob Cunningham MD  3/31/2023  18:21 EDT    "

## 2023-04-01 ENCOUNTER — READMISSION MANAGEMENT (OUTPATIENT)
Dept: CALL CENTER | Facility: HOSPITAL | Age: 73
End: 2023-04-01
Payer: MEDICARE

## 2023-04-01 VITALS
TEMPERATURE: 98.5 F | BODY MASS INDEX: 32.96 KG/M2 | RESPIRATION RATE: 18 BRPM | WEIGHT: 210 LBS | SYSTOLIC BLOOD PRESSURE: 138 MMHG | HEIGHT: 67 IN | OXYGEN SATURATION: 93 % | DIASTOLIC BLOOD PRESSURE: 73 MMHG | HEART RATE: 81 BPM

## 2023-04-01 LAB — CALCIUM SPEC-SCNC: 10.9 MG/DL (ref 8.6–10.5)

## 2023-04-01 PROCEDURE — G0378 HOSPITAL OBSERVATION PER HR: HCPCS

## 2023-04-01 PROCEDURE — A9270 NON-COVERED ITEM OR SERVICE: HCPCS | Performed by: SURGERY

## 2023-04-01 PROCEDURE — 63710000001 LEVOTHYROXINE 50 MCG TABLET: Performed by: SURGERY

## 2023-04-01 PROCEDURE — 63710000001 FAMOTIDINE 20 MG TABLET: Performed by: SURGERY

## 2023-04-01 PROCEDURE — 82310 ASSAY OF CALCIUM: CPT | Performed by: SURGERY

## 2023-04-01 RX ORDER — PSEUDOEPHEDRINE HCL 30 MG
100 TABLET ORAL 2 TIMES DAILY PRN
Qty: 30 CAPSULE | Refills: 0 | Status: SHIPPED | OUTPATIENT
Start: 2023-04-01

## 2023-04-01 RX ORDER — OXYCODONE HYDROCHLORIDE AND ACETAMINOPHEN 5; 325 MG/1; MG/1
1 TABLET ORAL EVERY 4 HOURS PRN
Qty: 10 TABLET | Refills: 0 | Status: SHIPPED | OUTPATIENT
Start: 2023-04-01 | End: 2023-04-10

## 2023-04-01 RX ORDER — CALCIUM CARBONATE 200(500)MG
2 TABLET,CHEWABLE ORAL 2 TIMES DAILY PRN
Qty: 90 TABLET | Refills: 3 | Status: SHIPPED | OUTPATIENT
Start: 2023-04-01 | End: 2023-06-30

## 2023-04-01 RX ORDER — LEVOTHYROXINE SODIUM 0.05 MG/1
50 TABLET ORAL
Qty: 30 TABLET | Refills: 2 | Status: SHIPPED | OUTPATIENT
Start: 2023-04-02 | End: 2023-07-01

## 2023-04-01 RX ADMIN — LEVOTHYROXINE SODIUM 50 MCG: 0.05 TABLET ORAL at 05:27

## 2023-04-01 RX ADMIN — FAMOTIDINE 20 MG: 20 TABLET, FILM COATED ORAL at 09:27

## 2023-04-01 NOTE — PLAN OF CARE
Goal Outcome Evaluation:  Plan of Care Reviewed With: patient        Progress: improving  Outcome Evaluation: Pt. rested well throughout the night, no c/o pain or discomfort, neck incision is c/d/i, pt. up with stand by, pt. tolerating diet well, VSS, will continue to monitor

## 2023-04-01 NOTE — DISCHARGE SUMMARY
Patient Name:  Adonay Rivas  YOB: 1950  [id number]      Date of Discharge:  4/1/2023    Discharge Diagnosis:   Hyperparathyroidism/right thyroid nodule    Problem List:  Active Hospital Problems    Diagnosis  POA   • **Hyperparathyroidism [E21.3]  Yes      Resolved Hospital Problems   No resolved problems to display.       Presenting Problem/History of Present Illness  Hyperparathyroidism [E21.3]      Hospital Course  Patient is a 72 y.o. male presented with hyperparathyroidism and a large right thyroid nodule. On 3/31/2023 I performed a right thyroid lobectomy. On postoperative day one, pain was well controlled. No numbness/tingling in fingers/toes/lips. No nausea/vomiting. No fevers/chills. Voiding spontaneously. Ambulating appropriately. The patient was thus found to be safe for discharge to home.           Procedures Performed    Procedure(s):  right thyroid lobectomy  -------------------       Consults:   Consults     No orders found for last 30 day(s).          Pertinent Test Results: N/A    Condition on Discharge:  Pain controlled with oral pain medication, tolerating diet, ambulating appropriately      Vital Signs  Temp:  [97.2 °F (36.2 °C)-98.8 °F (37.1 °C)] 98.5 °F (36.9 °C)  Heart Rate:  [67-83] 81  Resp:  [14-20] 18  BP: (100-146)/(61-92) 138/73    Discharge Disposition  Home or Self Care    Discharge Medications     Discharge Medications      New Medications      Instructions Start Date   calcium carbonate 500 MG chewable tablet  Commonly known as: Tums   2 tablets, Oral, 2 Times Daily PRN      docusate sodium 100 MG capsule   100 mg, Oral, 2 Times Daily PRN      levothyroxine 50 MCG tablet  Commonly known as: SYNTHROID, LEVOTHROID   50 mcg, Oral, Every Early Morning   Start Date: April 2, 2023     oxyCODONE-acetaminophen 5-325 MG per tablet  Commonly known as: PERCOCET   1 tablet, Oral, Every 4 Hours PRN         Continue These Medications      Instructions Start Date   latanoprost  0.005 % ophthalmic solution  Commonly known as: XALATAN   1 drop, Both Eyes, Daily, In am      propranolol 40 MG tablet  Commonly known as: INDERAL   40 mg, Oral, 3 Times Daily PRN      timolol 0.5 % ophthalmic solution  Commonly known as: TIMOPTIC   Administer 1 drop to both eyes Daily. In PM             Discharge Diet       Activity at Discharge  Activity Instructions     Discharge Activity      1) No driving until no longer taking narcotics.   2) Return to school / work in 1 week.  3) May shower. No tub baths. No swimming.   4) If develop numbness/tingling in fingers/toes/lips, take two TUMS. If symptoms do not improve in one hour, take two more and call Dr. Pedro's office.          Follow-up Appointments  Future Appointments   Date Time Provider Department Center   4/12/2023  3:45 PM Ashley Dillard MD [facility]     Additional Instructions for the Follow-ups that You Need to Schedule     Discharge Follow-up with Specified Provider: Dr. Pedro; 2 Weeks   As directed      To: Dr. Pedro    Follow Up: 2 Weeks    Follow Up Details: Call [phone] to make an appointment         Notify Physician or Go To The ED For the Following Conditions   As directed      Fever >100.4, increased pain, rapid neck swelling, new redness/drainage from incision, numbness/tingling in fingers/toes/lips not controlled by TUMS    Order Comments: Fever >100.4, increased pain, rapid neck swelling, new redness/drainage from incision, numbness/tingling in fingers/toes/lips not controlled by TUMS                Test Results Pending at Discharge  Pending Labs     Order Current Status    Tissue Pathology Exam In process          Time: Discharge 15 min    Mane Pedro MD   04/01/23   09:55 EDT

## 2023-04-01 NOTE — OUTREACH NOTE
Prep Survey    Flowsheet Row Responses   [facility] [place] patient discharged from? [place]   Is LACE score < 7 ? Yes   Eligibility [facility]   Date of Admission 03/31/23   Date of Discharge 04/01/23   Discharge Disposition Home or Self Care   Discharge diagnosis Hyperparathyroidism,   right thyroid lobectomy   Does the patient have one of the following disease processes/diagnoses(primary or secondary)? General Surgery   Does the patient have Home health ordered? No   Is there a DME ordered? No   Prep survey completed? Yes          Liyah JAY - Registered Nurse

## 2023-04-03 ENCOUNTER — TRANSITIONAL CARE MANAGEMENT TELEPHONE ENCOUNTER (OUTPATIENT)
Dept: CALL CENTER | Facility: HOSPITAL | Age: 73
End: 2023-04-03
Payer: MEDICARE

## 2023-04-03 LAB
CYTO UR: NORMAL
LAB AP CASE REPORT: NORMAL
LAB AP CLINICAL INFORMATION: NORMAL
PATH REPORT.FINAL DX SPEC: NORMAL
PATH REPORT.GROSS SPEC: NORMAL

## 2023-04-03 NOTE — OUTREACH NOTE
Call Center TCM Note    Flowsheet Row Responses   [facility] patient discharged from? [place]   Does the patient have one of the following disease processes/diagnoses(primary or secondary)? General Surgery   TCM attempt successful? Yes  [ VR]   Call start time 1609   Call end time 1618   Discharge diagnosis Hyperparathyroidism,   right thyroid lobectomy   Medication alerts for this patient pain is mild per pt, managed with Tylenol.    Meds reviewed with patient/caregiver? Yes   Is the patient having any side effects they believe may be caused by any medication additions or changes? No   Does the patient have all medications related to this admission filled (includes all antibiotics, pain medications, etc.) Yes   Is the patient taking all medications as directed (includes completed medication regime)? Yes   Comments  4/10/2023  2:00 PM Arrive by 1:45 PM HOSPITAL FOLLOW UP    Does the patient have an appointment with their PCP within 7 days of discharge? No   Nursing Interventions Assisted patient with making appointment per protocol   Psychosocial issues? No   Comments Pt not sleeping well, he is propped up on pillows. Mornings are most painful time of day. Neck incision w/surgical adhesive, wife is monitoring, no s/sx of infection. Sorethroat initially but resolving. Tolerating po intake. No issues urinating, using stool softener for BM.    Did the patient receive a copy of their discharge instructions? Yes   Nursing interventions Reviewed instructions with patient   What is the patient's perception of their health status since discharge? Improving   Nursing interventions Nurse provided patient education   Is the patient /caregiver able to teach back basic post-op care? Keep incision areas clean,dry and protected, Continue use of incentive spirometry at least 1 week post discharge, Practice 'cough and deep breath'   Is the patient/caregiver able to teach back signs and symptoms of incisional  infection? Increased redness, swelling or pain at the incisonal site, Increased drainage or bleeding, Incisional warmth   Is the patient/caregiver able to teach back steps to recovery at home? Rest and rebuild strength, gradually increase activity, Eat a well-balance diet   If the patient is a current smoker, are they able to teach back resources for cessation? Not a smoker   Is the patient/caregiver able to teach back the hierarchy of who to call/visit for symptoms/problems? PCP, Specialist, Home health nurse, Urgent Care, ED, 911 Yes   TCM call completed? Yes   Call end time 1618   Would this patient benefit from a Referral to [facility] Social Work? No          Alina Jack, ADDIE    4/3/2023, 16:18 EDT

## 2023-04-10 ENCOUNTER — OFFICE VISIT (OUTPATIENT)
Dept: INTERNAL MEDICINE | Facility: CLINIC | Age: 73
End: 2023-04-10
Payer: MEDICARE

## 2023-04-10 VITALS
BODY MASS INDEX: 32.49 KG/M2 | OXYGEN SATURATION: 97 % | HEIGHT: 67 IN | WEIGHT: 207 LBS | TEMPERATURE: 97.3 F | SYSTOLIC BLOOD PRESSURE: 140 MMHG | DIASTOLIC BLOOD PRESSURE: 68 MMHG | HEART RATE: 83 BPM

## 2023-04-10 DIAGNOSIS — G89.29 CHRONIC MIDLINE LOW BACK PAIN WITHOUT SCIATICA: ICD-10-CM

## 2023-04-10 DIAGNOSIS — N20.0 NEPHROLITHIASIS: ICD-10-CM

## 2023-04-10 DIAGNOSIS — E21.3 HYPERPARATHYROIDISM: Primary | ICD-10-CM

## 2023-04-10 DIAGNOSIS — M54.50 CHRONIC MIDLINE LOW BACK PAIN WITHOUT SCIATICA: ICD-10-CM

## 2023-04-10 DIAGNOSIS — G25.0 ESSENTIAL TREMOR: ICD-10-CM

## 2023-04-10 PROBLEM — Z12.11 COLON CANCER SCREENING: Status: RESOLVED | Noted: 2019-11-27 | Resolved: 2023-04-10

## 2023-04-10 PROBLEM — Z12.11 SCREENING FOR COLON CANCER: Status: RESOLVED | Noted: 2017-10-17 | Resolved: 2023-04-10

## 2023-04-10 RX ORDER — TAMSULOSIN HYDROCHLORIDE 0.4 MG/1
1 CAPSULE ORAL DAILY
Qty: 30 CAPSULE | Refills: 0 | Status: SHIPPED | OUTPATIENT
Start: 2023-04-10

## 2023-04-10 NOTE — PROGRESS NOTES
Transitional Care Follow Up Visit  Subjective     Adonay Rivas is a 72 y.o. male who presents for a transitional care management visit.    Within 48 business hours after discharge our office contacted him via telephone to coordinate his care and needs.      I reviewed and discussed the details of that call along with the discharge summary, hospital problems, inpatient lab results, inpatient diagnostic studies, and consultation reports with Adonay.     Current outpatient and discharge medications have been reconciled for the patient.  Reviewed by: Checo Kidd MD      Date of TCM Phone Call 4/1/2023   [facility]   Date of Admission 3/31/2023   Date of Discharge 4/1/2023   Discharge Disposition Home or Self Care     Risk for Readmission (LACE) Score: 1 (4/1/2023  6:01 AM)      History of Present Illness   Course During Hospital Stay: Recent hospital admission for parathyroidectomy along with thyroid lobectomy.  Patient with a history of recurring calcium kidney stones underwent work-up with 24-hour urine subsequently noted to have increasing parathyroid levels.  Underwent elective surgery postoperatively doing well.  He denies any cramps no headaches or constipation.  BP readings at home show good control he is currently on Tums chewable tablets  Recent history of back pain without radiation to his legs.  No new trauma.  He is doing some stretching exercises and using NSAIDs as needed   The following portions of the patient's history were reviewed and updated as appropriate: allergies, current medications, past family history, past medical history, past social history, past surgical history and problem list.    Review of Systems   Constitutional: Positive for fatigue. Negative for activity change, appetite change and fever.   HENT: Negative for congestion, ear discharge, ear pain and trouble swallowing.    Eyes: Negative for photophobia and visual disturbance.   Respiratory: Negative for  cough and shortness of breath.    Cardiovascular: Negative for chest pain and palpitations.   Gastrointestinal: Negative for abdominal distention, abdominal pain, constipation, diarrhea, nausea and vomiting.   Endocrine: Negative.    Genitourinary: Negative for dysuria, hematuria and urgency.   Musculoskeletal: Positive for arthralgias. Negative for back pain, joint swelling and myalgias.   Skin: Negative for color change and rash.   Allergic/Immunologic: Negative.    Neurological: Negative for dizziness, weakness, light-headedness and headaches.   Hematological: Negative for adenopathy. Does not bruise/bleed easily.   Psychiatric/Behavioral: Negative for agitation, confusion and dysphoric mood. The patient is not nervous/anxious.        Objective   Physical Exam  Constitutional:       General: He is not in acute distress.     Appearance: He is well-developed.   HENT:      Nose: Nose normal.   Eyes:      General: No scleral icterus.     Conjunctiva/sclera: Conjunctivae normal.   Neck:      Thyroid: No thyromegaly.      Trachea: No tracheal deviation.   Cardiovascular:      Rate and Rhythm: Normal rate and regular rhythm.      Heart sounds: No murmur heard.    No friction rub.   Pulmonary:      Effort: No respiratory distress.      Breath sounds: No wheezing or rales.   Abdominal:      General: There is no distension.      Palpations: Abdomen is soft. There is no mass.      Tenderness: There is no abdominal tenderness. There is no guarding.   Musculoskeletal:         General: No deformity. Normal range of motion.   Lymphadenopathy:      Cervical: No cervical adenopathy.   Skin:     General: Skin is warm and dry.      Findings: No erythema or rash.   Neurological:      Mental Status: He is alert and oriented to person, place, and time.      Cranial Nerves: No cranial nerve deficit.      Coordination: Coordination normal.      Deep Tendon Reflexes: Reflexes are normal and symmetric.   Psychiatric:         Behavior:  Behavior normal.         Thought Content: Thought content normal.         Judgment: Judgment normal.         Assessment & Plan   Diagnoses and all orders for this visit:    1. Hyperparathyroidism (Primary) clinically stable has an appointment with endocrinologist will hold off on lab work today    2. Essential tremor stable uses Inderal as needed only    3. Chronic midline low back pain without sciatica without radiculopathy advise heat pad NSAIDs as needed only    4. Nephrolithiasis stable encouraged to drink enough fluids.  Has some oxycodone left to be used as needed if he should have a renal colic.  I have also added on Flomax to be taken as needed

## 2023-04-12 ENCOUNTER — OFFICE VISIT (OUTPATIENT)
Dept: ENDOCRINOLOGY | Facility: CLINIC | Age: 73
End: 2023-04-12
Payer: MEDICARE

## 2023-04-12 VITALS
WEIGHT: 208 LBS | HEART RATE: 68 BPM | BODY MASS INDEX: 32.65 KG/M2 | SYSTOLIC BLOOD PRESSURE: 134 MMHG | OXYGEN SATURATION: 96 % | DIASTOLIC BLOOD PRESSURE: 70 MMHG | HEIGHT: 67 IN

## 2023-04-12 DIAGNOSIS — E21.0 PRIMARY HYPERPARATHYROIDISM: Primary | ICD-10-CM

## 2023-04-12 DIAGNOSIS — Z98.890 HISTORY OF THYROID SURGERY: ICD-10-CM

## 2023-04-12 DIAGNOSIS — E03.9 HYPOTHYROIDISM, UNSPECIFIED TYPE: ICD-10-CM

## 2023-04-12 LAB
ALBUMIN SERPL-MCNC: 4.2 G/DL (ref 3.5–5.2)
ANION GAP SERPL CALCULATED.3IONS-SCNC: 4 MMOL/L (ref 5–15)
BUN SERPL-MCNC: 14 MG/DL (ref 8–23)
BUN/CREAT SERPL: 16.5 (ref 7–25)
CALCIUM SPEC-SCNC: 11.8 MG/DL (ref 8.6–10.5)
CHLORIDE SERPL-SCNC: 105 MMOL/L (ref 98–107)
CO2 SERPL-SCNC: 30 MMOL/L (ref 22–29)
CREAT SERPL-MCNC: 0.85 MG/DL (ref 0.76–1.27)
EGFRCR SERPLBLD CKD-EPI 2021: 93.5 ML/MIN/1.73
GLUCOSE SERPL-MCNC: 100 MG/DL (ref 65–99)
PHOSPHATE SERPL-MCNC: 1.8 MG/DL (ref 2.5–4.5)
POTASSIUM SERPL-SCNC: 4.7 MMOL/L (ref 3.5–5.2)
SODIUM SERPL-SCNC: 139 MMOL/L (ref 136–145)
T4 FREE SERPL-MCNC: 1.16 NG/DL (ref 0.93–1.7)
TSH SERPL DL<=0.05 MIU/L-ACNC: 1.62 UIU/ML (ref 0.27–4.2)

## 2023-04-12 PROCEDURE — 84439 ASSAY OF FREE THYROXINE: CPT | Performed by: INTERNAL MEDICINE

## 2023-04-12 PROCEDURE — 36415 COLL VENOUS BLD VENIPUNCTURE: CPT | Performed by: INTERNAL MEDICINE

## 2023-04-12 PROCEDURE — 80069 RENAL FUNCTION PANEL: CPT | Performed by: INTERNAL MEDICINE

## 2023-04-12 PROCEDURE — 83970 ASSAY OF PARATHORMONE: CPT | Performed by: INTERNAL MEDICINE

## 2023-04-12 PROCEDURE — 84443 ASSAY THYROID STIM HORMONE: CPT | Performed by: INTERNAL MEDICINE

## 2023-04-12 PROCEDURE — 99214 OFFICE O/P EST MOD 30 MIN: CPT | Performed by: INTERNAL MEDICINE

## 2023-04-12 PROCEDURE — 82306 VITAMIN D 25 HYDROXY: CPT | Performed by: INTERNAL MEDICINE

## 2023-04-12 NOTE — PROGRESS NOTES
"Chief Complaint   Patient presents with   • hyperparathyroidism        HPI   Adonay Rivas is a 72 y.o. male had concerns including hyperparathyroidism.      Patient was referred to Dr. Pedro in the interim since last visit.  Sestamibi scan noted uptake in the right thyroid lobe.  Patient subsequently underwent right thyroid lobectomy.  Patient has not seen Dr. Pedro in follow-up after surgery.  He is taking calcium 1000 mg twice daily.  He denies any numbness or tingling.  He has not had repeat laboratory evaluation.  He does report that he was started on levothyroxine postoperatively, he has not been taking it separately from all other medications.    The following portions of the patient's history were reviewed and updated as appropriate: allergies, current medications and past social history.    Review of Systems   Gastrointestinal: Negative for abdominal pain, nausea and vomiting.   Endocrine: Negative for polydipsia and polyuria.      /70 (BP Location: Right arm, Patient Position: Sitting, Cuff Size: Adult)   Pulse 68   Ht 170.2 cm (67\")   Wt 94.3 kg (208 lb)   SpO2 96%   BMI 32.58 kg/m²      Physical Exam      Constitutional:  well developed; well nourished  no acute distress   ENT/Thyroid: Status post right lobectomy, surgical site healing, no erythema or drainage.   Eyes: Conjunctiva: clear   Respiratory:  breathing is unlabored  clear to auscultation bilaterally   Cardiovascular:  regular rate and rhythm   Chest:  Not performed.   Abdomen: Not performed.   : Not performed.   Musculoskeletal: Not performed   Skin: not performed.   Neuro: normal without focal findings   Psych: mood and affect are within normal limits       Labs/Imaging   Latest Reference Range & Units 11/17/22 12:00 03/28/23 15:43 03/31/23 08:52 04/01/23 05:03   Glucose 65 - 99 mg/dL 95      Sodium 136 - 145 mmol/L 137      Potassium 3.5 - 5.2 mmol/L 4.7 4.7     CO2 22.0 - 29.0 mmol/L 30.0 (H)      Chloride 98 - 107 mmol/L " 102      Anion Gap 5.0 - 15.0 mmol/L 5.0      Creatinine 0.76 - 1.27 mg/dL 0.85      BUN 8 - 23 mg/dL 10      BUN/Creatinine Ratio 7.0 - 25.0  11.8      Calcium 8.6 - 10.5 mg/dL 10.8 (H)   10.9 (H)   eGFR >60.0 mL/min/1.73 93.5      Albumin 3.50 - 5.20 g/dL 4.30      Phosphorus 2.5 - 4.5 mg/dL 2.1 (L)      PTH Intact (Serial Monitor) 15.0 - 65.0 pg/mL 155.0 (H)  166.7 (H)    1,25-Dihydroxy, Vitamin D 24.8 - 81.5 pg/mL 75.4      25 Hydroxy, Vitamin D 30.0 - 100.0 ng/ml 19.5 (L)      (H): Data is abnormally high  (L): Data is abnormally low    Final Diagnosis   1. THYROID, RIGHT LOBE, HEMITHYROIDECTOMY:  Benign thyroid lobe with follicular adenoma  Negative for malignancy  All margins benign  1 benign lymph node            Diagnoses and all orders for this visit:    1. Primary hyperparathyroidism (Primary)  -     Vitamin D,25-Hydroxy; Future  -     Renal Function Panel; Future  -     PTH, Intact; Future  Patient seen by Dr. Pedro.  He has not yet had his postoperative follow-up.  This is scheduled for later this week.  Final pathology from surgery with only thyroid tissue.  No abnormal enlarged parathyroid glands noted per operative report.  Plan to update labs.  Patient is currently taking calcium 1000 mg twice daily.  Determine next steps after review of labs.    2. History of thyroid surgery  3. Hypothyroidism, unspecified type  -     TSH; Future  -     T4, Free; Future  Patient underwent right lobectomy due to thyroid nodule, final pathology benign.  See above.  Patient started on levothyroxine 50 mcg daily following surgery.  Appropriate administration was reviewed.  Patient inquired as to whether he would require lifelong thyroid hormone replacement.  Plan to update thyroid function testing today, if able, can attempt to reduce medication per patient preference.       Return in about 4 months (around 8/12/2023) for Next scheduled follow up. The patient was instructed to contact the clinic with any interval  questions or concerns.    Ashley Dillard MD   Endocrinologist    Dictated Utilizing Dragon Dictation

## 2023-04-13 ENCOUNTER — TELEPHONE (OUTPATIENT)
Dept: ENDOCRINOLOGY | Facility: CLINIC | Age: 73
End: 2023-04-13
Payer: MEDICARE

## 2023-04-13 LAB
25(OH)D3 SERPL-MCNC: 12.3 NG/ML (ref 30–100)
PTH-INTACT SERPL-MCNC: 188 PG/ML (ref 15–65)

## 2023-04-14 ENCOUNTER — TRANSCRIBE ORDERS (OUTPATIENT)
Dept: ADMINISTRATIVE | Facility: HOSPITAL | Age: 73
End: 2023-04-14
Payer: MEDICARE

## 2023-04-14 DIAGNOSIS — E21.0 PRIMARY HYPERPARATHYROIDISM: Primary | ICD-10-CM

## 2023-04-21 ENCOUNTER — HOSPITAL ENCOUNTER (OUTPATIENT)
Dept: CT IMAGING | Facility: HOSPITAL | Age: 73
Discharge: HOME | End: 2023-04-21
Payer: MEDICARE

## 2023-04-21 DIAGNOSIS — E21.0 PRIMARY HYPERPARATHYROIDISM: ICD-10-CM

## 2023-04-21 PROCEDURE — 25510000001 IOPAMIDOL PER 1 ML: Performed by: SURGERY

## 2023-04-21 PROCEDURE — 70492 CT SFT TSUE NCK W/O & W/DYE: CPT

## 2023-04-21 RX ADMIN — IOPAMIDOL 75 ML: 755 INJECTION, SOLUTION INTRAVENOUS at 14:55

## 2023-05-03 RX ORDER — LEVOTHYROXINE SODIUM 0.05 MG/1
50 TABLET ORAL
Qty: 30 TABLET | Refills: 2 | Status: SHIPPED | OUTPATIENT
Start: 2023-05-03 | End: 2023-08-01

## 2023-05-03 NOTE — TELEPHONE ENCOUNTER
Please contact patient.  We may be able to wean him off levothyroxine in the future, this will depend on the trend of his labs, but I would recommend he stay on 50 mcg daily for now and send refills to pharmacy.

## 2023-05-03 NOTE — TELEPHONE ENCOUNTER
Pt called was put on Levothyroxine 50 mcg by Dr Pedro after surgery. On 04/01/23 pt wanted to know if he is to continue taking Levothyroxine 50 mcg. If so pt needs a prescription sent to pharmacy. Pt last ov 04/12/23 pt next ov 08/29/23. Please notify pt

## 2023-05-17 ENCOUNTER — PRE-ADMISSION TESTING (OUTPATIENT)
Dept: PREADMISSION TESTING | Facility: HOSPITAL | Age: 73
End: 2023-05-17
Payer: MEDICARE

## 2023-05-17 VITALS — BODY MASS INDEX: 32.66 KG/M2 | HEIGHT: 67 IN | WEIGHT: 208.11 LBS

## 2023-05-17 LAB
DEPRECATED RDW RBC AUTO: 44.5 FL (ref 37–54)
ERYTHROCYTE [DISTWIDTH] IN BLOOD BY AUTOMATED COUNT: 12.6 % (ref 12.3–15.4)
HCT VFR BLD AUTO: 48.6 % (ref 37.5–51)
HGB BLD-MCNC: 15.6 G/DL (ref 13–17.7)
MCH RBC QN AUTO: 30.6 PG (ref 26.6–33)
MCHC RBC AUTO-ENTMCNC: 32.1 G/DL (ref 31.5–35.7)
MCV RBC AUTO: 95.3 FL (ref 79–97)
PLATELET # BLD AUTO: 216 10*3/MM3 (ref 140–450)
PMV BLD AUTO: 10.5 FL (ref 6–12)
POTASSIUM SERPL-SCNC: 4.7 MMOL/L (ref 3.5–5.2)
RBC # BLD AUTO: 5.1 10*6/MM3 (ref 4.14–5.8)
WBC NRBC COR # BLD: 6.5 10*3/MM3 (ref 3.4–10.8)

## 2023-05-17 PROCEDURE — 85027 COMPLETE CBC AUTOMATED: CPT

## 2023-05-17 PROCEDURE — 84132 ASSAY OF SERUM POTASSIUM: CPT

## 2023-05-17 PROCEDURE — 36415 COLL VENOUS BLD VENIPUNCTURE: CPT

## 2023-05-17 NOTE — PAT
Patient to apply Chlorhexadine wipes  to surgical area (as instructed) the night before procedure and the AM of procedure. Wipes provided.    Patient viewed general PAT education video as instructed in their preoperative information received from their surgeon.  Patient stated the general PAT education video was viewed in its entirety and survey completed.  Copies of [facility] general education handouts (Incentive Spirometry, Meds to Beds Program, Patient Belongings, Pre-op skin preparation instructions, Blood Glucose testing, Visitor policy, Surgery FAQ, Code H) distributed to patient if not printed. Education related to the PAT pass and skin preparation for surgery (if applicable) completed in PAT as a reinforcement to PAT education video. Patient instructed to return PAT pass provided today as well as completed skin preparation sheet (if applicable) on the day of procedure.     Additionally if patient had not viewed video yet but intended to view it at home or in our waiting area, then referred them to the handout with QR code/link provided during PAT visit.  Instructed patient to complete survey after viewing the video in its entirety.  Encouraged patient/family to read PAT general education handouts thoroughly and notify PAT staff with any questions or concerns. Patient verbalized understanding of all information and priority content.    ekg from 3-2023 in epic

## 2023-05-18 ENCOUNTER — ANESTHESIA EVENT (OUTPATIENT)
Dept: PERIOP | Facility: HOSPITAL | Age: 73
End: 2023-05-18
Payer: MEDICARE

## 2023-05-18 ENCOUNTER — HOSPITAL ENCOUNTER (OUTPATIENT)
Facility: HOSPITAL | Age: 73
Setting detail: HOSPITAL OUTPATIENT SURGERY
Discharge: HOME | End: 2023-05-18
Attending: SURGERY | Admitting: SURGERY
Payer: MEDICARE

## 2023-05-18 ENCOUNTER — ANESTHESIA (OUTPATIENT)
Dept: PERIOP | Facility: HOSPITAL | Age: 73
End: 2023-05-18
Payer: MEDICARE

## 2023-05-18 VITALS
TEMPERATURE: 98.2 F | WEIGHT: 208 LBS | OXYGEN SATURATION: 95 % | HEART RATE: 72 BPM | BODY MASS INDEX: 32.65 KG/M2 | SYSTOLIC BLOOD PRESSURE: 141 MMHG | RESPIRATION RATE: 16 BRPM | DIASTOLIC BLOOD PRESSURE: 71 MMHG | HEIGHT: 67 IN

## 2023-05-18 DIAGNOSIS — E21.3 HYPERPARATHYROIDISM: Primary | ICD-10-CM

## 2023-05-18 DIAGNOSIS — E21.0 PRIMARY HYPERPARATHYROIDISM: ICD-10-CM

## 2023-05-18 LAB — PTH-INTACT SERPL-MCNC: 170 PG/ML (ref 15–65)

## 2023-05-18 PROCEDURE — 25010000002 PROPOFOL 10 MG/ML EMULSION: Performed by: NURSE ANESTHETIST, CERTIFIED REGISTERED

## 2023-05-18 PROCEDURE — 25010000002 FENTANYL CITRATE (PF) 100 MCG/2ML SOLUTION: Performed by: NURSE ANESTHETIST, CERTIFIED REGISTERED

## 2023-05-18 PROCEDURE — 25010000002 HYDROMORPHONE 1 MG/ML SOLUTION

## 2023-05-18 PROCEDURE — 88331 PATH CONSLTJ SURG 1 BLK 1SPC: CPT | Performed by: PATHOLOGY

## 2023-05-18 PROCEDURE — 88305 TISSUE EXAM BY PATHOLOGIST: CPT | Performed by: SURGERY

## 2023-05-18 PROCEDURE — 25010000002 SUGAMMADEX 200 MG/2ML SOLUTION: Performed by: NURSE ANESTHETIST, CERTIFIED REGISTERED

## 2023-05-18 PROCEDURE — 25010000002 DEXAMETHASONE PER 1 MG: Performed by: NURSE ANESTHETIST, CERTIFIED REGISTERED

## 2023-05-18 PROCEDURE — 25010000002 CEFAZOLIN IN DEXTROSE 2-4 GM/100ML-% SOLUTION: Performed by: SURGERY

## 2023-05-18 PROCEDURE — 25010000002 ONDANSETRON PER 1 MG: Performed by: NURSE ANESTHETIST, CERTIFIED REGISTERED

## 2023-05-18 PROCEDURE — 83970 ASSAY OF PARATHORMONE: CPT | Performed by: SURGERY

## 2023-05-18 DEVICE — ABSORBABLE HEMOSTAT (OXIDIZED REGENERATED CELLULOSE, U.S.P.)
Type: IMPLANTABLE DEVICE | Site: PARATHYROID | Status: FUNCTIONAL
Brand: SURGICEL

## 2023-05-18 DEVICE — HORIZON TI SMALL 6 CLIPS/CART
Type: IMPLANTABLE DEVICE | Site: PARATHYROID | Status: FUNCTIONAL
Brand: WECK

## 2023-05-18 DEVICE — HORIZON TI MED 6/CART
Type: IMPLANTABLE DEVICE | Site: PARATHYROID | Status: FUNCTIONAL
Brand: WECK

## 2023-05-18 RX ORDER — SODIUM CHLORIDE, SODIUM LACTATE, POTASSIUM CHLORIDE, CALCIUM CHLORIDE 600; 310; 30; 20 MG/100ML; MG/100ML; MG/100ML; MG/100ML
9 INJECTION, SOLUTION INTRAVENOUS CONTINUOUS PRN
Status: DISCONTINUED | OUTPATIENT
Start: 2023-05-18 | End: 2023-05-18 | Stop reason: HOSPADM

## 2023-05-18 RX ORDER — LIDOCAINE HYDROCHLORIDE 10 MG/ML
INJECTION, SOLUTION EPIDURAL; INFILTRATION; INTRACAUDAL; PERINEURAL AS NEEDED
Status: DISCONTINUED | OUTPATIENT
Start: 2023-05-18 | End: 2023-05-18 | Stop reason: SURG

## 2023-05-18 RX ORDER — PROMETHAZINE HYDROCHLORIDE 25 MG/1
25 TABLET ORAL ONCE AS NEEDED
Status: DISCONTINUED | OUTPATIENT
Start: 2023-05-18 | End: 2023-05-18 | Stop reason: HOSPADM

## 2023-05-18 RX ORDER — SODIUM CHLORIDE 9 MG/ML
40 INJECTION, SOLUTION INTRAVENOUS AS NEEDED
Status: DISCONTINUED | OUTPATIENT
Start: 2023-05-18 | End: 2023-05-18 | Stop reason: HOSPADM

## 2023-05-18 RX ORDER — PROMETHAZINE HYDROCHLORIDE 25 MG/1
25 SUPPOSITORY RECTAL ONCE AS NEEDED
Status: DISCONTINUED | OUTPATIENT
Start: 2023-05-18 | End: 2023-05-18 | Stop reason: HOSPADM

## 2023-05-18 RX ORDER — FAMOTIDINE 20 MG/1
20 TABLET, FILM COATED ORAL
Status: COMPLETED | OUTPATIENT
Start: 2023-05-18 | End: 2023-05-18

## 2023-05-18 RX ORDER — EPHEDRINE SULFATE 50 MG/ML
INJECTION INTRAVENOUS AS NEEDED
Status: DISCONTINUED | OUTPATIENT
Start: 2023-05-18 | End: 2023-05-18 | Stop reason: SURG

## 2023-05-18 RX ORDER — CALCIUM CARBONATE 500 MG/1
2 TABLET, CHEWABLE ORAL 2 TIMES DAILY
Qty: 90 TABLET | Refills: 3 | Status: SHIPPED | OUTPATIENT
Start: 2023-05-18 | End: 2023-08-16

## 2023-05-18 RX ORDER — ONDANSETRON 2 MG/ML
4 INJECTION INTRAMUSCULAR; INTRAVENOUS ONCE AS NEEDED
Status: DISCONTINUED | OUTPATIENT
Start: 2023-05-18 | End: 2023-05-18 | Stop reason: HOSPADM

## 2023-05-18 RX ORDER — MAGNESIUM HYDROXIDE 1200 MG/15ML
LIQUID ORAL AS NEEDED
Status: DISCONTINUED | OUTPATIENT
Start: 2023-05-18 | End: 2023-05-18 | Stop reason: HOSPADM

## 2023-05-18 RX ORDER — PROPOFOL 10 MG/ML
VIAL (ML) INTRAVENOUS AS NEEDED
Status: DISCONTINUED | OUTPATIENT
Start: 2023-05-18 | End: 2023-05-18 | Stop reason: SURG

## 2023-05-18 RX ORDER — HYDROCODONE BITARTRATE AND ACETAMINOPHEN 5; 325 MG/1; MG/1
TABLET ORAL
Status: COMPLETED
Start: 2023-05-18 | End: 2023-05-18

## 2023-05-18 RX ORDER — DEXAMETHASONE SODIUM PHOSPHATE 4 MG/ML
INJECTION, SOLUTION INTRA-ARTICULAR; INTRALESIONAL; INTRAMUSCULAR; INTRAVENOUS; SOFT TISSUE AS NEEDED
Status: DISCONTINUED | OUTPATIENT
Start: 2023-05-18 | End: 2023-05-18 | Stop reason: SURG

## 2023-05-18 RX ORDER — NALOXONE HCL 0.4 MG/ML
0.4 VIAL (ML) INJECTION AS NEEDED
Status: DISCONTINUED | OUTPATIENT
Start: 2023-05-18 | End: 2023-05-18 | Stop reason: HOSPADM

## 2023-05-18 RX ORDER — ONDANSETRON 2 MG/ML
INJECTION INTRAMUSCULAR; INTRAVENOUS AS NEEDED
Status: DISCONTINUED | OUTPATIENT
Start: 2023-05-18 | End: 2023-05-18 | Stop reason: SURG

## 2023-05-18 RX ORDER — DROPERIDOL 2.5 MG/ML
0.62 INJECTION, SOLUTION INTRAMUSCULAR; INTRAVENOUS ONCE AS NEEDED
Status: DISCONTINUED | OUTPATIENT
Start: 2023-05-18 | End: 2023-05-18 | Stop reason: HOSPADM

## 2023-05-18 RX ORDER — HYDROCODONE BITARTRATE AND ACETAMINOPHEN 5; 325 MG/1; MG/1
1 TABLET ORAL ONCE AS NEEDED
Status: DISCONTINUED | OUTPATIENT
Start: 2023-05-18 | End: 2023-05-18 | Stop reason: HOSPADM

## 2023-05-18 RX ORDER — FENTANYL CITRATE 50 UG/ML
50 INJECTION, SOLUTION INTRAMUSCULAR; INTRAVENOUS
Status: DISCONTINUED | OUTPATIENT
Start: 2023-05-18 | End: 2023-05-18 | Stop reason: HOSPADM

## 2023-05-18 RX ORDER — ROCURONIUM BROMIDE 10 MG/ML
INJECTION, SOLUTION INTRAVENOUS AS NEEDED
Status: DISCONTINUED | OUTPATIENT
Start: 2023-05-18 | End: 2023-05-18 | Stop reason: SURG

## 2023-05-18 RX ORDER — LABETALOL HYDROCHLORIDE 5 MG/ML
5 INJECTION, SOLUTION INTRAVENOUS
Status: DISCONTINUED | OUTPATIENT
Start: 2023-05-18 | End: 2023-05-18 | Stop reason: HOSPADM

## 2023-05-18 RX ORDER — HYDRALAZINE HYDROCHLORIDE 20 MG/ML
5 INJECTION INTRAMUSCULAR; INTRAVENOUS
Status: DISCONTINUED | OUTPATIENT
Start: 2023-05-18 | End: 2023-05-18 | Stop reason: HOSPADM

## 2023-05-18 RX ORDER — SODIUM CHLORIDE 0.9 % (FLUSH) 0.9 %
3-10 SYRINGE (ML) INJECTION AS NEEDED
Status: DISCONTINUED | OUTPATIENT
Start: 2023-05-18 | End: 2023-05-18 | Stop reason: HOSPADM

## 2023-05-18 RX ORDER — IPRATROPIUM BROMIDE AND ALBUTEROL SULFATE 2.5; .5 MG/3ML; MG/3ML
3 SOLUTION RESPIRATORY (INHALATION) ONCE AS NEEDED
Status: DISCONTINUED | OUTPATIENT
Start: 2023-05-18 | End: 2023-05-18 | Stop reason: HOSPADM

## 2023-05-18 RX ORDER — OXYCODONE HYDROCHLORIDE AND ACETAMINOPHEN 5; 325 MG/1; MG/1
1 TABLET ORAL EVERY 4 HOURS PRN
Qty: 10 TABLET | Refills: 0 | Status: SHIPPED | OUTPATIENT
Start: 2023-05-18

## 2023-05-18 RX ORDER — CEFAZOLIN SODIUM 2 G/100ML
2 INJECTION, SOLUTION INTRAVENOUS ONCE
Status: COMPLETED | OUTPATIENT
Start: 2023-05-18 | End: 2023-05-18

## 2023-05-18 RX ORDER — FENTANYL CITRATE 50 UG/ML
INJECTION, SOLUTION INTRAMUSCULAR; INTRAVENOUS AS NEEDED
Status: DISCONTINUED | OUTPATIENT
Start: 2023-05-18 | End: 2023-05-18 | Stop reason: SURG

## 2023-05-18 RX ORDER — DROPERIDOL 2.5 MG/ML
0.62 INJECTION, SOLUTION INTRAMUSCULAR; INTRAVENOUS
Status: DISCONTINUED | OUTPATIENT
Start: 2023-05-18 | End: 2023-05-18 | Stop reason: HOSPADM

## 2023-05-18 RX ORDER — LIDOCAINE HYDROCHLORIDE 10 MG/ML
0.5 INJECTION, SOLUTION EPIDURAL; INFILTRATION; INTRACAUDAL; PERINEURAL ONCE AS NEEDED
Status: COMPLETED | OUTPATIENT
Start: 2023-05-18 | End: 2023-05-18

## 2023-05-18 RX ORDER — OXYCODONE HYDROCHLORIDE AND ACETAMINOPHEN 5; 325 MG/1; MG/1
1 TABLET ORAL ONCE AS NEEDED
Status: DISCONTINUED | OUTPATIENT
Start: 2023-05-18 | End: 2023-05-18 | Stop reason: HOSPADM

## 2023-05-18 RX ORDER — SODIUM CHLORIDE 0.9 % (FLUSH) 0.9 %
3 SYRINGE (ML) INJECTION EVERY 12 HOURS SCHEDULED
Status: DISCONTINUED | OUTPATIENT
Start: 2023-05-18 | End: 2023-05-18 | Stop reason: HOSPADM

## 2023-05-18 RX ADMIN — FAMOTIDINE 20 MG: 20 TABLET, FILM COATED ORAL at 08:26

## 2023-05-18 RX ADMIN — SODIUM CHLORIDE, POTASSIUM CHLORIDE, SODIUM LACTATE AND CALCIUM CHLORIDE 9 ML/HR: 600; 310; 30; 20 INJECTION, SOLUTION INTRAVENOUS at 08:26

## 2023-05-18 RX ADMIN — PROPOFOL 200 MG: 10 INJECTION, EMULSION INTRAVENOUS at 11:07

## 2023-05-18 RX ADMIN — Medication 0.5 MG: at 14:00

## 2023-05-18 RX ADMIN — ROCURONIUM BROMIDE 30 MG: 10 SOLUTION INTRAVENOUS at 11:07

## 2023-05-18 RX ADMIN — SODIUM CHLORIDE, POTASSIUM CHLORIDE, SODIUM LACTATE AND CALCIUM CHLORIDE: 600; 310; 30; 20 INJECTION, SOLUTION INTRAVENOUS at 12:15

## 2023-05-18 RX ADMIN — DEXAMETHASONE SODIUM PHOSPHATE 4 MG: 4 INJECTION, SOLUTION INTRAMUSCULAR; INTRAVENOUS at 11:10

## 2023-05-18 RX ADMIN — PROPOFOL 50 MG: 10 INJECTION, EMULSION INTRAVENOUS at 11:26

## 2023-05-18 RX ADMIN — LIDOCAINE HYDROCHLORIDE 0.5 ML: 10 INJECTION, SOLUTION EPIDURAL; INFILTRATION; INTRACAUDAL; PERINEURAL at 08:26

## 2023-05-18 RX ADMIN — LIDOCAINE HYDROCHLORIDE 50 MG: 10 INJECTION, SOLUTION EPIDURAL; INFILTRATION; INTRACAUDAL; PERINEURAL at 11:07

## 2023-05-18 RX ADMIN — SUGAMMADEX 200 MG: 100 INJECTION, SOLUTION INTRAVENOUS at 11:23

## 2023-05-18 RX ADMIN — EPHEDRINE SULFATE 10 MG: 50 INJECTION INTRAVENOUS at 11:15

## 2023-05-18 RX ADMIN — PROPOFOL 50 MCG/KG/MIN: 10 INJECTION, EMULSION INTRAVENOUS at 11:30

## 2023-05-18 RX ADMIN — CEFAZOLIN SODIUM 2 G: 2 INJECTION, SOLUTION INTRAVENOUS at 11:15

## 2023-05-18 RX ADMIN — ONDANSETRON 4 MG: 2 INJECTION INTRAMUSCULAR; INTRAVENOUS at 11:29

## 2023-05-18 RX ADMIN — HYDROCODONE BITARTRATE AND ACETAMINOPHEN 1 TABLET: 5; 325 TABLET ORAL at 13:42

## 2023-05-18 RX ADMIN — FENTANYL CITRATE 100 MCG: 50 INJECTION, SOLUTION INTRAMUSCULAR; INTRAVENOUS at 11:04

## 2023-05-18 RX ADMIN — HYDROMORPHONE HYDROCHLORIDE 0.5 MG: 1 INJECTION, SOLUTION INTRAMUSCULAR; INTRAVENOUS; SUBCUTANEOUS at 14:00

## 2023-05-18 NOTE — ANESTHESIA PROCEDURE NOTES
Airway  Urgency: elective    Date/Time: 5/18/2023 11:09 AM  Airway not difficult    General Information and Staff    Patient location during procedure: OR  CRNA/CAA: Tommy Portillo CRNA    Indications and Patient Condition  Indications for airway management: airway protection    Preoxygenated: yes  MILS not maintained throughout  Mask difficulty assessment: 1 - vent by mask    Final Airway Details  Final airway type: endotracheal airway      Successful airway: ETT and NIM tube  Cuffed: yes   Successful intubation technique: video laryngoscopy  Endotracheal tube insertion site: oral  Blade: Glidescope  Blade size: 4  ETT size (mm): 7.0  Cormack-Lehane Classification: grade I - full view of glottis  Placement verified by: chest auscultation and capnometry   Measured from: lips  ETT/EBT  to lips (cm): 20  Number of attempts at approach: 1  Assessment: lips, teeth, and gum same as pre-op and atraumatic intubation    Additional Comments  Negative epigastric sounds, Breath sound equal bilaterally with symmetric chest rise and fall. Elective use of grace for nims visualization

## 2023-05-18 NOTE — BRIEF OP NOTE
PARATHYROIDECTOMY  Progress Note    Adonay Rivas  5/18/2023    Pre-op Diagnosis:   Hyperparathyroidism, primary       Post-Op Diagnosis Codes:     * Hyperparathyroidism, primary [E21.0]    Procedure/CPT® Codes:        Procedure(s):  RE-DO PARATHYROIDECTOMY              Surgeon(s):  Mane Pedro MD    Anesthesia: General    Staff:   Circulator: Aura Butts RN; Sandra Stuart, RN; Amelie Liu RN  Scrub Person: Romina Oliveros; Elías Figueroa RN  Nursing Assistant: Celina Delgadillo PCT  Assistant: Omid Perales PA  Assistant: Omid Perales PA      Estimated Blood Loss: minimal    Urine Voided: * No values recorded between 5/18/2023 11:01 AM and 5/18/2023 12:49 PM *    Specimens:                Specimens     ID Source Type Tests Collected By Collected At Frozen?    A Parathyroid Gland Tissue · TISSUE PATHOLOGY EXAM   Mane Pedro MD 5/18/23 1243 Yes    Description: Possible left inferior parathyroid     This specimen was not marked as sent.                Drains: * No LDAs found *    Findings: Enlarged parathyroid gland in the left thymus, confirmed by frozen section        Complications: None    Assistant: Omid Perales PA  was responsible for performing the following activities: Retraction, Suction, Suturing, Closing and Placing Dressing and their skilled assistance was necessary for the success of this case.    Mane Pedro MD     Date: 5/18/2023  Time: 12:53 EDT

## 2023-05-18 NOTE — ANESTHESIA POSTPROCEDURE EVALUATION
Patient: Adonay Rivas    Procedure Summary     Date: 05/18/23 Room / Location:  [facility]    Anesthesia Start: 1102 Anesthesia Stop: 1311    Procedure: RE-DO PARATHYROIDECTOMY (Neck) Diagnosis: Hyperparathyroidism, primary    Surgeons: Mane Pedro MD Provider: Sony Pina MD    Anesthesia Type: general ASA Status: 2          Anesthesia Type: general    Vitals  No vitals data found for the desired time range.  RR 14  SPO2 98% 2L NC  HR 79 SR  T 97F  BP 98/75        Post Anesthesia Care and Evaluation    Patient location during evaluation: PACU  Patient participation: complete - patient participated  Level of consciousness: awake and alert  Pain management: adequate    Airway patency: patent  Anesthetic complications: No anesthetic complications  PONV Status: none  Cardiovascular status: hemodynamically stable and acceptable  Respiratory status: nonlabored ventilation, acceptable and nasal cannula  Hydration status: acceptable

## 2023-05-18 NOTE — INTERVAL H&P NOTE
"[facility] Pre-op    Full history and physical note from office is attached.    /80 (BP Location: Right arm, Patient Position: Lying)   Pulse 70   Temp 97.8 °F (36.6 °C) (Tympanic)   Resp 16   Ht 170.2 cm (67\")   Wt 94.3 kg (208 lb)   SpO2 96%   BMI 32.58 kg/m²       LAB Results:  Lab Results   Component Value Date    WBC 6.50 05/17/2023    HGB 15.6 05/17/2023    HCT 48.6 05/17/2023    MCV 95.3 05/17/2023     05/17/2023    GLUCOSE 100 (H) 04/12/2023    BUN 14 04/12/2023    CREATININE 0.85 04/12/2023    EGFRIFNONA 84 03/24/2021    EGFRIFAFRI 98 01/05/2021     04/12/2023    K 4.7 05/17/2023     04/12/2023    CO2 30.0 (H) 04/12/2023    PHOS 1.8 (C) 04/12/2023    CALCIUM 11.8 (H) 04/12/2023    ALBUMIN 4.2 04/12/2023    AST 17 04/01/2022    ALT 18 04/01/2022    BILITOT 1.2 04/01/2022      Latest Reference Range & Units 04/12/23 15:32   Phosphorus 2.5 - 4.5 mg/dL 1.8 (C)   PTH Intact (Serial Monitor) 15.0 - 65.0 pg/mL 188.0 (H)   TSH Baseline 0.270 - 4.200 uIU/mL 1.620   Free T4 0.93 - 1.70 ng/dL 1.16   25 Hydroxy, Vitamin D 30.0 - 100.0 ng/ml 12.3 (L)   (C): Data is critically low  (H): Data is abnormally high  (L): Data is abnormally low  4/21/23 CT neck:  Findings:  Time resolved imaging of the neck demonstrates changes of prior right thyroidectomy. Deep to the remaining left thyroid lobe, there is an area which demonstrates decreased attenuation relative to thyroid on noncontrast imaging, image 201, relatively   isodense enhancement on early contrast imaging and increased relative washout on delayed phase imaging, image 127 of series 12, most consistent with a small parathyroid adenoma measuring 9 x 7 mm. More inferior to the left thyroid lobe, somewhat   retrosternal in location, there is an additional nodule which is similarly low-attenuation on noncontrast imaging and demonstrates some mild early washout, suspicious for second possible adenoma measuring 13 mm on image " 226 of the noncontrast sequence.   Otherwise, limited intracranial evaluation demonstrates no acute findings. There is no pathologic cervical adenopathy. Vascular structures appear patent. The aerodigestive tract structures are normal. Multilevel cervical spondylosis is present. The lung   apices are clear. The parotid and submandibular glands are symmetric.     IMPRESSION:  Impression:  2 distinct nodular foci are present demonstrating dynamic contrast imaging characteristics suspicious for parathyroid adenoma as above, both on the left, one deep to the thyroid and one inferior, somewhat retrosternal in location, measuring 9 and 13 mm   respectively.    Cancer Staging (if applicable)  Cancer Patient: __ yes __no __unknown__N/A; If yes, clinical stage T:__ N:__M:__, stage group or __N/A      Impression: primary hyperparathyroidism       Plan: RE-DO PARATHYROIDECTOMY      DEANDRE Witt   5/18/2023   08:26 EDT

## 2023-05-18 NOTE — OP NOTE
General Surgery Operative Note    PARATHYROIDECTOMY  Procedure Report    Patient Name:  Adonay Rivas  YOB: 1950  [id number]     Date of Surgery:  5/18/2023       Pre-op Diagnosis: Hyperparathyroidism, primary    Post-op Diagnosis: Hyperparathyroidism, primary    Procedure(s):  RE-DO PARATHYROIDECTOMY    Surgeon: Mane Pedro MD    Assistant: Omid Perales PA     Anesthesia: General         Estimated Blood Loss: minimal    Complications: None    Implants:    Implant Name Type Inv. Item Serial No.  Lot No. LRB No. Used Action   CLIP LIGAT VASC HORIZON TI MD VILMA 6CT - [id number] Implant CLIP LIGAT VASC HORIZON TI MD VILMA 6CT  [employer] [id number] N/A 2 Implanted   CLIP LIGAT VASC HORIZON TI SM YEL 6CT - [id number] Implant CLIP LIGAT VASC HORIZON TI SM YEL 6CT  [employer] [id number] N/A 2 Implanted   HEMOST ABS SURGICEL 4X8IN - [id number] Implant HEMOST ABS SURGICEL 4X8IN  [employer]  [employer] J [employer] J  N/A 1 Implanted       Specimen:          Specimens     ID Source Type Tests Collected By Collected At Frozen?    A Parathyroid Gland Tissue · TISSUE PATHOLOGY EXAM   Mane Pedro MD 5/18/23 1243 Yes    Description: Possible left inferior parathyroid     This specimen was not marked as sent.              Findings: Enlarged parathyroid gland in the left thymus, confirmed by frozen section    Indications: The patient is a 72-year-old male with a history of hyperparathyroidism who had previously undergone a neck exploration with right thyroid lobectomy.  However the patient had persistently high PTH and calcium levels.  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  We discussed the risk, benefits, alternatives to redo parathyroidectomy and the patient was found to be agreeable.  Informed consent was obtained.    Description of Procedure:     After obtaining informed consent, the patient was taken to the  operating room and placed in supine position. After appropriate DVT and antibiotic prophylaxis, general anesthesia was induced with placement of a NIM tube for nerve monitoring. The neck was prepped and draped in standard sterile fashion.    The previous skin incision was reopened using a 15 blade.  The dermis and subcutaneous tissue were divided using Bovie electrocautery.  The platysma was dissected out and divided using Bovie electrocautery.  Subplatysmal flaps were created superiorly to the thyroid cartilage, inferiorly to the sternal notch, and laterally to the sternocleidomastoid muscles.  The strap muscles were divided in the midline at the median raphae.  Attention was paid to the left neck.  The strap muscles were dissected off the anterior and lateral surfaces of the thyroid lobe using Bovie electrocautery.  Babcocks were placed on the thyroid lobe and it was retracted anteriorly and medially.  The recurrent laryngeal nerve was identified by both nerve stimulation and visualization.  Inspection of the soft tissues of the left substernal neck identified an ovoid lesion within the superior portion of the left thymus.  This was circumferentially dissected using a combination of bipolar electrocautery and the harmonic scalpel.  This was sent for frozen section evaluation and was consistent with hypercellular parathyroid tissue.  Soft tissues posterior to the left thyroid lobe were inspected and no abnormally enlarged parathyroid tissue was identified.    Valsalva maneuver was performed by anesthesia to assess for any further bleeding and any further bleeding was controlled using placement of clips.  After hemostasis was obtained the left recurrent laryngeal nerve was grossly intact throughout the entire visualized course and stimulated appropriately with nerve stimulation.  Surgicel was placed in all areas of dissection.  The strap muscles were loosely reapproximated using interrupted 3-0 Vicryl.  The platysma  was reapproximated using interrupted 3-0 Vicryl.  The skin was reapproximated using a running subcuticular 4-0 Monocryl.  A dry dressing was placed on top of this.  The patient awoke from anesthesia without complications and was taken to the PACU in stable condition.        Assistant: Omid Preales PA  was responsible for performing the following activities: Retraction, Suction, Suturing, Closing and Placing Dressing and their skilled assistance was necessary for the success of this case.    Mane Pedro MD     Date: 5/18/2023  Time: 12:55 EDT

## 2023-05-18 NOTE — ANESTHESIA PREPROCEDURE EVALUATION
Anesthesia Evaluation     Patient summary reviewed and Nursing notes reviewed   NPO Solid Status: > 8 hours  NPO Liquid Status: > 2 hours           Airway   Mallampati: II  TM distance: >3 FB  Neck ROM: full  No difficulty expected  Dental      Pulmonary    (-) asthma, shortness of breath, recent URI, sleep apnea, not a smoker  Cardiovascular     ECG reviewed    (-) past MI, dysrhythmias, angina, cardiac stents    ROS comment: ECG NSR     Neuro/Psych  (+) tremors (essential ),    (-) seizures, CVA  GI/Hepatic/Renal/Endo    (+) obesity,   renal disease stones, thyroid problem hypothyroidism  (-) diabetes    Musculoskeletal     Abdominal    Substance History      OB/GYN          Other        ROS/Med Hx Other: Slow to wake - BIS        Phys Exam Other: Xiong 3 -grade 1 view -[place] 2023                 Anesthesia Plan    ASA 2     general     (Aim for MAP >65 BIS >45 (POCD risk)  ( Post op mild confusion somnolence last GA 2023))  intravenous induction     Anesthetic plan, risks, benefits, and alternatives have been provided, discussed and informed consent has been obtained with: patient.    Plan discussed with CRNA.        CODE STATUS:

## 2023-05-19 LAB
CYTO UR: NORMAL
LAB AP CASE REPORT: NORMAL
LAB AP CLINICAL INFORMATION: NORMAL
Lab: NORMAL
PATH REPORT.FINAL DX SPEC: NORMAL
PATH REPORT.GROSS SPEC: NORMAL

## 2023-07-27 ENCOUNTER — OFFICE VISIT (OUTPATIENT)
Dept: INTERNAL MEDICINE | Facility: CLINIC | Age: 73
End: 2023-07-27
Payer: MEDICARE

## 2023-07-27 VITALS
TEMPERATURE: 98.4 F | OXYGEN SATURATION: 96 % | HEIGHT: 67 IN | DIASTOLIC BLOOD PRESSURE: 75 MMHG | SYSTOLIC BLOOD PRESSURE: 127 MMHG | HEART RATE: 67 BPM | BODY MASS INDEX: 32.93 KG/M2 | WEIGHT: 209.8 LBS

## 2023-07-27 DIAGNOSIS — Z23 NEED FOR PNEUMOCOCCAL VACCINATION: Primary | ICD-10-CM

## 2023-07-27 DIAGNOSIS — M54.50 CHRONIC MIDLINE LOW BACK PAIN WITHOUT SCIATICA: ICD-10-CM

## 2023-07-27 DIAGNOSIS — G89.29 CHRONIC MIDLINE LOW BACK PAIN WITHOUT SCIATICA: ICD-10-CM

## 2023-07-27 DIAGNOSIS — E03.9 ACQUIRED HYPOTHYROIDISM: ICD-10-CM

## 2023-07-27 DIAGNOSIS — E21.3 HYPERPARATHYROIDISM: ICD-10-CM

## 2023-07-27 DIAGNOSIS — M43.6 NECK STIFFNESS: ICD-10-CM

## 2023-07-27 NOTE — PROGRESS NOTES
The ABCs of the Annual Wellness Visit  Subsequent Medicare Wellness Visit    Subjective    Adonay Rivas is a 73 y.o. male who presents for a Subsequent Medicare Wellness Visit.    The following portions of the patient's history were reviewed and   updated as appropriate: allergies, current medications, past family history, past medical history, past social history, past surgical history, and problem list.    Compared to one year ago, the patient feels his physical   health is the same.    Compared to one year ago, the patient feels his mental   health is the same.    Recent Hospitalizations:  He was admitted within the past 365 days at [facility].       Current Medical Providers:  Patient Care Team:  Checo Kidd MD as PCP - General (Internal Medicine)  Brenton Walker MD as Consulting Physician (Urology)  Ashley Dillard MD as Consulting Physician (Endocrinology)    Outpatient Medications Prior to Visit   Medication Sig Dispense Refill    calcium carbonate (Tums) 500 MG chewable tablet Chew 2 tablets 2 (Two) Times a Day for 90 days. 90 tablet 3    latanoprost (XALATAN) 0.005 % ophthalmic solution Administer 1 drop to both eyes Daily. In am  3    levothyroxine (SYNTHROID, LEVOTHROID) 50 MCG tablet Take 1 tablet by mouth Every Morning for 90 days. 30 tablet 2    propranolol (INDERAL) 40 MG tablet Take 1 tablet by mouth 3 (Three) Times a Day As Needed (tremors). 60 tablet 2    timolol (TIMOPTIC) 0.5 % ophthalmic solution Administer 1 drop to both eyes Every Night. In PM      oxyCODONE-acetaminophen (PERCOCET) 5-325 MG per tablet Take 1 tablet by mouth Every 4 (Four) Hours As Needed (Pain). 10 tablet 0     No facility-administered medications prior to visit.       No opioid medication identified on active medication list. I have reviewed chart for other potential  high risk medication/s and harmful drug interactions in the elderly.        Aspirin is not on active medication list.  Aspirin use is not  "indicated based on review of current medical condition/s. Risk of harm outweighs potential benefits.  .    Patient Active Problem List   Diagnosis    Essential tremor    Nephrolithiasis    Chronic midline low back pain without sciatica    Hyperparathyroidism     Advance Care Planning   Advance Care Planning     Advance Directive is not on file.  ACP discussion was held with the patient during this visit. Patient does not have an advance directive, declines further assistance.     Objective    Vitals:    23 1326   BP: 127/75   BP Location: Left arm   Patient Position: Sitting   Cuff Size: Adult   Pulse: 67   Temp: 98.4 °F (36.9 °C)   SpO2: 96%   Weight: 95.2 kg (209 lb 12.8 oz)   Height: 170.2 cm (67.01\")     Estimated body mass index is 32.85 kg/m² as calculated from the following:    Height as of this encounter: 170.2 cm (67.01\").    Weight as of this encounter: 95.2 kg (209 lb 12.8 oz).    BMI is >= 30 and <35. (Class 1 Obesity). The following options were offered after discussion;: weight loss educational material (shared in after visit summary) and nutrition counseling/recommendations      Does the patient have evidence of cognitive impairment? No          HEALTH RISK ASSESSMENT    Smoking Status:  Social History     Tobacco Use   Smoking Status Never   Smokeless Tobacco Never     Alcohol Consumption:  Social History     Substance and Sexual Activity   Alcohol Use No     Fall Risk Screen:    PRAVIN Fall Risk Assessment was completed, and patient is at LOW risk for falls.Assessment completed on:2023    Depression Screenin/27/2023     1:32 PM   PHQ-2/PHQ-9 Depression Screening   Little Interest or Pleasure in Doing Things 0-->not at all   Feeling Down, Depressed or Hopeless 0-->not at all   PHQ-9: Brief Depression Severity Measure Score 0       Health Habits and Functional and Cognitive Screenin/27/2023     1:31 PM   Functional & Cognitive Status   Do you have difficulty preparing food " and eating? No   Do you have difficulty bathing yourself, getting dressed or grooming yourself? No   Do you have difficulty using the toilet? No   Do you have difficulty moving around from place to place? No   Do you have trouble with steps or getting out of a bed or a chair? No   Current Diet Well Balanced Diet   Dental Exam Up to date   Eye Exam Up to date   Exercise (times per week) 7 times per week   Current Exercises Include Yard Work;Other   Do you need help using the phone?  No   Are you deaf or do you have serious difficulty hearing?  No   Do you need help to go to places out of walking distance? No   Do you need help shopping? No   Do you need help preparing meals?  No   Do you need help with housework?  No   Do you need help with laundry? No   Do you need help taking your medications? No   Do you need help managing money? No   Do you ever drive or ride in a car without wearing a seat belt? No   Have you felt unusual stress, anger or loneliness in the last month? No   Who do you live with? Spouse   If you need help, do you have trouble finding someone available to you? No   Have you been bothered in the last four weeks by sexual problems? No   Do you have difficulty concentrating, remembering or making decisions? No       Age-appropriate Screening Schedule:  Refer to the list below for future screening recommendations based on patient's age, sex and/or medical conditions. Orders for these recommended tests are listed in the plan section. The patient has been provided with a written plan.    Health Maintenance   Topic Date Due    TDAP/TD VACCINES (1 - Tdap) Never done    COVID-19 Vaccine (6 - Pfizer series) 02/16/2023    ANNUAL WELLNESS VISIT  04/01/2023    LIPID PANEL  04/01/2023    ZOSTER VACCINE (1 of 2) 11/07/2029 (Originally 7/5/2002)    INFLUENZA VACCINE  10/01/2023    COLORECTAL CANCER SCREENING  01/13/2030    HEPATITIS C SCREENING  Completed    Pneumococcal Vaccine 65+  Completed                  CMS  Preventative Services Quick Reference  Risk Factors Identified During Encounter  Polypharmacy: Medication List reviewed and Medications are appropriate for patient  The above risks/problems have been discussed with the patient.  Pertinent information has been shared with the patient in the After Visit Summary.  An After Visit Summary and PPPS were made available to the patient.    Follow Up:   Next Medicare Wellness visit to be scheduled in 1 year.       Additional E&M Note during same encounter follows:  Patient has multiple medical problems which are significant and separately identifiable that require additional work above and beyond the Medicare Wellness Visit.      Chief Complaint  Medicare Wellness-subsequent    Subjective        HPI  Adonay Rivas is also being seen today for HYPOTHYROIDISM, HYPERPARATHYROIDISM.  History of recent parathyroidectomy.  Denies constipation headache or excessive fatigue    Review of Systems   Constitutional:  Negative for activity change, appetite change, fatigue and fever.   HENT:  Negative for congestion, ear discharge, ear pain and trouble swallowing.    Eyes:  Negative for photophobia and visual disturbance.   Respiratory:  Negative for cough and shortness of breath.    Cardiovascular:  Negative for chest pain and palpitations.   Gastrointestinal:  Negative for abdominal distention, constipation, diarrhea, nausea and vomiting.   Genitourinary:  Negative for dysuria, hematuria and urgency.   Musculoskeletal:  Positive for arthralgias. Negative for back pain, joint swelling and myalgias.   Skin:  Negative for color change and rash.   Neurological:  Negative for dizziness, weakness, light-headedness and confusion.   Hematological:  Negative for adenopathy. Does not bruise/bleed easily.   Psychiatric/Behavioral:  Negative for agitation and dysphoric mood. The patient is not nervous/anxious.      Objective   Vital Signs:  /75 (BP Location: Left arm, Patient Position:  "Sitting, Cuff Size: Adult)   Pulse 67   Temp 98.4 °F (36.9 °C)   Ht 170.2 cm (67.01\")   Wt 95.2 kg (209 lb 12.8 oz)   SpO2 96%   BMI 32.85 kg/m²     Physical Exam  Constitutional:       General: He is not in acute distress.     Appearance: He is well-developed.   HENT:      Nose: Nose normal.   Eyes:      General: No scleral icterus.     Conjunctiva/sclera: Conjunctivae normal.   Neck:      Thyroid: No thyromegaly.      Trachea: No tracheal deviation.   Cardiovascular:      Rate and Rhythm: Normal rate and regular rhythm.      Heart sounds: No murmur heard.    No friction rub.   Pulmonary:      Effort: No respiratory distress.      Breath sounds: No wheezing or rales.   Abdominal:      General: There is no distension.      Palpations: Abdomen is soft. There is no mass.      Tenderness: There is no abdominal tenderness. There is no guarding.   Musculoskeletal:         General: Deformity present. Normal range of motion.   Lymphadenopathy:      Cervical: No cervical adenopathy.   Skin:     General: Skin is warm and dry.      Findings: No erythema or rash.   Neurological:      Mental Status: He is alert and oriented to person, place, and time.      Cranial Nerves: No cranial nerve deficit.      Coordination: Coordination normal.      Deep Tendon Reflexes: Reflexes are normal and symmetric.   Psychiatric:         Behavior: Behavior normal.         Thought Content: Thought content normal.         Judgment: Judgment normal.                       Assessment and Plan   Diagnoses and all orders for this visit:    1. Need for pneumococcal vaccination (Primary)  -     Pneumococcal Conjugate Vaccine 20-Valent (PCV20)    2. Hyperparathyroidism noted after episode of nephrolithiasis.  Underwent parathyroid ectomy in the neck without improvement in his hypercalcemia.  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  Repeat PTH and calcium levels today    3. Chronic midline " low back pain without sciatica continue with exercise program needs to improve his abdominal and back muscle tone.  Discussed core conditioning    4. Acquired hypothyroidism status post right hemithyroidectomy.  On levothyroxine check TSH             Follow Up   No follow-ups on file.  Patient was given instructions and counseling regarding his condition or for health maintenance advice. Please see specific information pulled into the AVS if appropriate.

## 2023-07-28 DIAGNOSIS — E21.3 HYPERPARATHYROIDISM: Primary | ICD-10-CM

## 2023-07-28 LAB
ALBUMIN SERPL-MCNC: 4.3 G/DL (ref 3.5–5.2)
ALBUMIN/GLOB SERPL: 2 G/DL
ALP SERPL-CCNC: 91 U/L (ref 39–117)
ALT SERPL-CCNC: 19 U/L (ref 1–41)
AST SERPL-CCNC: 18 U/L (ref 1–40)
BILIRUB SERPL-MCNC: 0.9 MG/DL (ref 0–1.2)
BUN SERPL-MCNC: 19 MG/DL (ref 8–23)
BUN/CREAT SERPL: 19.4 (ref 7–25)
CALCIUM SERPL-MCNC: 9.9 MG/DL (ref 8.6–10.5)
CHLORIDE SERPL-SCNC: 105 MMOL/L (ref 98–107)
CO2 SERPL-SCNC: 27.7 MMOL/L (ref 22–29)
CREAT SERPL-MCNC: 0.98 MG/DL (ref 0.76–1.27)
EGFRCR SERPLBLD CKD-EPI 2021: 82.4 ML/MIN/1.73
GLOBULIN SER CALC-MCNC: 2.1 GM/DL
GLUCOSE SERPL-MCNC: 100 MG/DL (ref 65–99)
POTASSIUM SERPL-SCNC: 4.8 MMOL/L (ref 3.5–5.2)
PROT SERPL-MCNC: 6.4 G/DL (ref 6–8.5)
SODIUM SERPL-SCNC: 142 MMOL/L (ref 136–145)
TSH SERPL DL<=0.005 MIU/L-ACNC: 1.4 UIU/ML (ref 0.27–4.2)

## 2023-08-16 ENCOUNTER — TREATMENT (OUTPATIENT)
Dept: PHYSICAL THERAPY | Facility: CLINIC | Age: 73
End: 2023-08-16
Payer: MEDICARE

## 2023-08-16 DIAGNOSIS — M43.6 NECK STIFFNESS: Primary | ICD-10-CM

## 2023-08-16 NOTE — PROGRESS NOTES
Physical Therapy Initial Evaluation and Plan of Care  [address] [place] [address]      Patient: Adonay Rivas   : 1950  Diagnosis/ICD-10 Code:  Neck stiffness [M43.6]  Referring practitioner: Checo Kidd MD  Date of Initial Visit: 2023  Today's Date: 2023  Patient seen for 1 session         Visit Diagnoses:    ICD-10-CM ICD-9-CM   1. Neck stiffness  M43.6 723.5         Subjective Questionnaire: NDI:      Subjective Evaluation    History of Present Illness    Subjective comment: Pt reports stiffness in the neck especially with driving or any time he moves his neck too far. Notes N&T but is related to his thyroid issues. Notes his LBP is worse and more limiting than the neck pain. Pain worst first thing in the morning but improves with movement. Notes midback pain that is bad if he sneezes or steps in a hole. Was given exercises but unable to do the SLR because of pain. Sleeps on 2 pillows.  Patient Occupation: semi-retired  Quality of life: good    Pain  At worst pain ratin  Location: low back, no pain in the neck  Quality: tight and sharp  Relieving factors: change in position  Aggravating factors: lifting and prolonged positioning  Progression: no change    Social Support  Lives in: multiple-level home  Lives with: spouse    Diagnostic Tests  No diagnostic tests performed    Treatments  No previous or current treatments  Patient Goals  Patient goals for therapy: decreased pain, increased motion and increased strength           Objective          Static Posture     Head  Forward and rotated right.    Shoulders  Elevated and rounded.    Thoracic Spine  Hyperkyphosis.    Postural Observations  Seated posture: poor  Standing posture: poor      Active Range of Motion   Cervical/Thoracic Spine   Cervical    Subcranial retraction: restricted   Flexion: 60 degrees   Extension: 30 degrees   Left lateral flexion: 15 degrees   Right lateral flexion: 15 degrees   Left  rotation: 45 degrees   Right rotation: 65 degrees     Strength/Myotome Testing     Left Shoulder     Planes of Motion   Flexion: 4+   Abduction: 4+     Right Shoulder     Planes of Motion   Flexion: 4+   Abduction: 4+     Lumbar Flexibility Comments:   B hamstring 45 deg        Assessment & Plan       Assessment  Impairments: abnormal muscle tone, abnormal or restricted ROM and pain with function   Functional limitations: lifting   Assessment details: Pt is a 72 YO M who presents to the clinic with cervical spine stiffness and low back pain. Pt with signs and symptoms consistent with cervical muscle strain secondary to posture and low back pain secondary to core muscle imbalances. Pt would benefit from skilled PT for improving posture, overall flexibility and core strength to aid with daily demands.  Barriers to therapy: chronicity, severity  Prognosis: fair    Goals  Plan Goals: SHORT TERM GOALS:     4 weeks  1. Pt independent with HEP  2. Pt to demonstrate cervical AROM rotation of L 55 deg and B lateral flexion 20 deg to allow for improved ability to perform ADL's  3. Pt to demonstrate improved cervical retraction without cuing.     LONG TERM GOALS:   8 weeks  1. Pt to demonstrate cervical AROM rotation to 70 deg to allow for improved safety when driving  2. Pt to demonstrate B hamstring length to 65 deg to aid with pain in the low back.   3. Pt to report being able  work in the home without increase in pain in the neck or low back.       Plan  Therapy options: will be seen for skilled therapy services  Planned modality interventions: cryotherapy, dry needling and thermotherapy (hydrocollator packs)  Planned therapy interventions: abdominal trunk stabilization, body mechanics training, flexibility, functional ROM exercises, joint mobilization, manual therapy, neuromuscular re-education, postural training, soft tissue mobilization, spinal/joint mobilization, strengthening, stretching and therapeutic  activities  Frequency: 1x week  Duration in weeks: 8  Treatment plan discussed with: patient      History # of Personal Factors and/or Comorbidities: MODERATE (1-2)  Examination of Body System(s): # of elements: LOW (1-2)  Clinical Presentation: STABLE   Clinical Decision Making: LOW       Timed:         Manual Therapy:         mins  57007;     Therapeutic Exercise:   18      mins  64380;     Neuromuscular Christopher:       mins  31203;    Therapeutic Activity:          mins  17299;     Gait Training:          mins  73783;     Ultrasound:          mins  78170;    Ionto                                   mins   11405  Self Care                            mins   13811  Canalith Repos         mins 02688      Un-Timed:  Electrical Stimulation:         mins  09852 ( );  Dry Needling          mins self-pay  Traction          mins 36084  Low Eval   34      Mins  62538  Mod Eval          Mins  82334  High Eval                            Mins  92973        Timed Treatment:   18   mins   Total Treatment:     52   mins      PT: Padmini Robbins PT     License Number: [id number]    Electronically signed by Padmini Robbins PT, 08/16/23, 9:23 AM EDT    Certification Period: 8/16/2023 thru 11/13/2023  I certify that the therapy services are furnished while this patient is under my care.  The services outlined above are required by this patient, and will be reviewed every 90 days.         Physician Signature:__________________________________________________    PHYSICIAN: Checo Kidd MD  NPI: [id number]      DATE:     Please sign and return via fax to [email or web address] . Thank you, [facility].

## 2023-08-22 ENCOUNTER — TREATMENT (OUTPATIENT)
Dept: PHYSICAL THERAPY | Facility: CLINIC | Age: 73
End: 2023-08-22
Payer: MEDICARE

## 2023-08-22 DIAGNOSIS — M43.6 NECK STIFFNESS: Primary | ICD-10-CM

## 2023-08-22 NOTE — PROGRESS NOTES
Physical Therapy Daily Treatment Note  [address] [place] [address]      Patient: Adonay Rivas   : 1950  Referring practitioner: Checo Kidd MD  Date of Initial Visit: Type: THERAPY  Noted: 2023  Today's Date: 2023  Patient seen for 2 sessions         Visit Diagnoses:    ICD-10-CM ICD-9-CM   1. Neck stiffness  M43.6 723.5       Subjective   Patient reports the neck and mid back are doing ok but the low back is giving him the worst pain. Notes he thought about not coming because he is having such a hard time laying down to do exercises and getting back up. Severe pain when moving from supine to sit     Objective   See Exercise, Manual, and Modality Logs for complete treatment.     Assessment/Plan  Pt tolerated treatment and had decreased pain performing sit to stand when he was cued to activate TA  muscle. Educated pt regarding updated ways to perform exercises without having to lay down. Also discussed practicing activating TA muscles when he is performing transitional movements. Continue with current POT progressing pt per tolerance.    Timed:         Manual Therapy: 15        mins  87262;     Therapeutic Exercise:   23      mins  76431;     Neuromuscular Christopher:      mins  78399;    Therapeutic Activity:          mins  21584;     Gait Training:           mins  21456;     Ultrasound:          mins  03297;    Ionto                                   mins   95595  Self Care                            mins   82430  Canalith Repos         mins 75436      Un-Timed:  Electrical Stimulation:        mins  34041 ( );  Dry Needling          mins self-pay  Traction          mins 64667      Timed Treatment:   38   mins   Total Treatment:     38   mins    Padmini Robbins, PT  [place] License: [id number]

## 2023-08-29 ENCOUNTER — OFFICE VISIT (OUTPATIENT)
Dept: ENDOCRINOLOGY | Facility: CLINIC | Age: 73
End: 2023-08-29
Payer: MEDICARE

## 2023-08-29 VITALS
BODY MASS INDEX: 33.12 KG/M2 | HEIGHT: 67 IN | OXYGEN SATURATION: 96 % | HEART RATE: 68 BPM | WEIGHT: 211 LBS | DIASTOLIC BLOOD PRESSURE: 68 MMHG | SYSTOLIC BLOOD PRESSURE: 126 MMHG

## 2023-08-29 DIAGNOSIS — Z98.890 HISTORY OF THYROID SURGERY: ICD-10-CM

## 2023-08-29 DIAGNOSIS — E03.9 HYPOTHYROIDISM, UNSPECIFIED TYPE: ICD-10-CM

## 2023-08-29 DIAGNOSIS — E21.0 PRIMARY HYPERPARATHYROIDISM: Primary | ICD-10-CM

## 2023-08-29 LAB
ALBUMIN SERPL-MCNC: 4.5 G/DL (ref 3.5–5.2)
ANION GAP SERPL CALCULATED.3IONS-SCNC: 9 MMOL/L (ref 5–15)
BUN SERPL-MCNC: 15 MG/DL (ref 8–23)
BUN/CREAT SERPL: 16.7 (ref 7–25)
CALCIUM SPEC-SCNC: 10 MG/DL (ref 8.6–10.5)
CHLORIDE SERPL-SCNC: 104 MMOL/L (ref 98–107)
CO2 SERPL-SCNC: 28 MMOL/L (ref 22–29)
CREAT SERPL-MCNC: 0.9 MG/DL (ref 0.76–1.27)
EGFRCR SERPLBLD CKD-EPI 2021: 91.3 ML/MIN/1.73
GLUCOSE SERPL-MCNC: 75 MG/DL (ref 65–99)
PHOSPHATE SERPL-MCNC: 2.8 MG/DL (ref 2.5–4.5)
POTASSIUM SERPL-SCNC: 4.6 MMOL/L (ref 3.5–5.2)
PTH-INTACT SERPL-MCNC: 34.2 PG/ML (ref 15–65)
SODIUM SERPL-SCNC: 141 MMOL/L (ref 136–145)
TSH SERPL DL<=0.05 MIU/L-ACNC: 2.81 UIU/ML (ref 0.27–4.2)

## 2023-08-29 PROCEDURE — 80069 RENAL FUNCTION PANEL: CPT | Performed by: INTERNAL MEDICINE

## 2023-08-29 PROCEDURE — 82306 VITAMIN D 25 HYDROXY: CPT | Performed by: INTERNAL MEDICINE

## 2023-08-29 PROCEDURE — 83970 ASSAY OF PARATHORMONE: CPT | Performed by: INTERNAL MEDICINE

## 2023-08-29 PROCEDURE — 84443 ASSAY THYROID STIM HORMONE: CPT | Performed by: INTERNAL MEDICINE

## 2023-08-29 NOTE — PROGRESS NOTES
"Chief Complaint   Patient presents with    hyperparathyroidism        HPI   Adonay Rivas is a 73 y.o. male had concerns including hyperparathyroidism.      Patient underwent repeat surgery with Dr. Pedro in May 2023 with removal of left inferior parathyroid gland.    Patient had repeat labs in July which showed a normal calcium but has not yet had repeat PTH.  Patient also reports that he discontinued levothyroxine in the interim between visits.  He believes he stopped this a few weeks prior to most recent labs.  Patient not currently taking any calcium or vitamin D.    The following portions of the patient's history were reviewed and updated as appropriate: allergies, current medications, and past social history.    Review of Systems   HENT:  Negative for trouble swallowing and voice change.       /68 (BP Location: Left arm, Patient Position: Sitting, Cuff Size: Adult)   Pulse 68   Ht 170.2 cm (67\")   Wt 95.7 kg (211 lb)   SpO2 96%   BMI 33.05 kg/mý      Physical Exam      Constitutional:  well developed; well nourished  no acute distress   ENT/Thyroid: Status post hemithyroidectomy, well-healed surgical scar.   Eyes: Conjunctiva: clear   Respiratory:  breathing is unlabored  clear to auscultation bilaterally   Cardiovascular:  regular rate and rhythm   Chest:  Not performed.   Abdomen: soft, non-tender; no masses   : Not performed.   Musculoskeletal: Not performed   Skin: not performed.   Neuro: mental status, speech normal   Psych: mood and affect are within normal limits       Labs/Imaging   Latest Reference Range & Units 05/18/23 09:03 07/27/23 14:44   Sodium 136 - 145 mmol/L  142   Potassium 3.5 - 5.2 mmol/L  4.8   Chloride 98 - 107 mmol/L  105   CO2 22.0 - 29.0 mmol/L  27.7   BUN 8 - 23 mg/dL  19   Creatinine 0.76 - 1.27 mg/dL  0.98   BUN/Creatinine Ratio 7.0 - 25.0   19.4   EGFR Result >60.0 mL/min/1.73  82.4   Glucose 65 - 99 mg/dL  100 (H)   Calcium 8.6 - 10.5 mg/dL  9.9   Alkaline " Phosphatase 39 - 117 U/L  91   Total Protein 6.0 - 8.5 g/dL  6.4   Albumin 3.5 - 5.2 g/dL  4.3   A/G Ratio g/dL  2.0   AST (SGOT) 1 - 40 U/L  18   ALT (SGPT) 1 - 41 U/L  19   Total Bilirubin 0.0 - 1.2 mg/dL  0.9   PTH Intact (Serial Monitor) 15.0 - 65.0 pg/mL 170.0 (H)    TSH Baseline 0.270 - 4.200 uIU/mL  1.400   (H): Data is abnormally high    Diagnoses and all orders for this visit:    1. Primary hyperparathyroidism (Primary)  -     Renal Function Panel  -     Vitamin D,25-Hydroxy  -     PTH, Intact  Status post parathyroidectomy in May 2023  Most recent labs from July 2023 with normal calcium  Patient has not had a repeat PTH following surgery.  We will plan to update labs today.  Reviewed with patient that if labs are indicative of surgical cure (normalization of PTH and calcium) he should have calcium monitored annually and that this can be completed with his PCP.  Determine next steps after review of labs.    2. History of thyroid surgery  3. Hypothyroidism, unspecified type  -     TSH  Patient was empirically started on levothyroxine 50 mcg daily following hemithyroidectomy.  Patient self discontinued levothyroxine in the interim between visits.  He is unsure how long he had been off therapy when TSH was drawn in July 2023.  We will plan to update TSH level today and then determine next steps.  Reviewed recommendation for annual monitoring of TSH given prior hemithyroidectomy.    Discussed with patient that if labs are normal today he will not require routine follow-up in this office.  I would recommend he have serum calcium and TSH monitored at minimum annually which could be completed with his PCP, if desired.  I would be happy to see him back at any point in the future should new concerns arise.      Return if symptoms worsen or fail to improve. The patient was instructed to contact the clinic with any interval questions or concerns.    Ashley Dillard MD   Endocrinologist    Dictated Utilizing Dragon  Dictation

## 2023-08-30 LAB — 25(OH)D3 SERPL-MCNC: 18.2 NG/ML (ref 30–100)

## 2024-02-23 LAB — PTH-INTACT SERPL-MCNC: 34 PG/ML (ref 15–65)

## 2024-06-24 ENCOUNTER — LAB (OUTPATIENT)
Dept: LAB | Facility: HOSPITAL | Age: 74
End: 2024-06-24
Payer: MEDICARE

## 2024-06-24 ENCOUNTER — TELEPHONE (OUTPATIENT)
Dept: ENDOCRINOLOGY | Facility: CLINIC | Age: 74
End: 2024-06-24
Payer: MEDICARE

## 2024-06-24 DIAGNOSIS — Z98.890 HISTORY OF THYROID SURGERY: Primary | ICD-10-CM

## 2024-06-24 DIAGNOSIS — E55.9 VITAMIN D DEFICIENCY: Primary | ICD-10-CM

## 2024-06-24 DIAGNOSIS — Z86.39 HISTORY OF HYPOTHYROIDISM: ICD-10-CM

## 2024-06-24 DIAGNOSIS — Z98.890 HISTORY OF THYROID SURGERY: ICD-10-CM

## 2024-06-24 DIAGNOSIS — Z98.890 HISTORY OF PARATHYROID SURGERY: ICD-10-CM

## 2024-06-24 DIAGNOSIS — E55.9 VITAMIN D DEFICIENCY: ICD-10-CM

## 2024-06-24 LAB
25(OH)D3 SERPL-MCNC: 34.9 NG/ML (ref 30–100)
ANION GAP SERPL CALCULATED.3IONS-SCNC: 8.6 MMOL/L (ref 5–15)
BUN SERPL-MCNC: 14 MG/DL (ref 8–23)
BUN/CREAT SERPL: 14.9 (ref 7–25)
CALCIUM SPEC-SCNC: 9.3 MG/DL (ref 8.6–10.5)
CHLORIDE SERPL-SCNC: 105 MMOL/L (ref 98–107)
CO2 SERPL-SCNC: 26.4 MMOL/L (ref 22–29)
CREAT SERPL-MCNC: 0.94 MG/DL (ref 0.76–1.27)
EGFRCR SERPLBLD CKD-EPI 2021: 86.7 ML/MIN/1.73
GLUCOSE SERPL-MCNC: 99 MG/DL (ref 65–99)
POTASSIUM SERPL-SCNC: 4.3 MMOL/L (ref 3.5–5.2)
SODIUM SERPL-SCNC: 140 MMOL/L (ref 136–145)
T4 FREE SERPL-MCNC: 1.09 NG/DL (ref 0.92–1.68)
TSH SERPL DL<=0.05 MIU/L-ACNC: 2.66 UIU/ML (ref 0.27–4.2)

## 2024-06-24 PROCEDURE — 84439 ASSAY OF FREE THYROXINE: CPT

## 2024-06-24 PROCEDURE — 82652 VIT D 1 25-DIHYDROXY: CPT

## 2024-06-24 PROCEDURE — 82306 VITAMIN D 25 HYDROXY: CPT

## 2024-06-24 PROCEDURE — 84443 ASSAY THYROID STIM HORMONE: CPT

## 2024-06-24 PROCEDURE — 80048 BASIC METABOLIC PNL TOTAL CA: CPT

## 2024-06-24 NOTE — TELEPHONE ENCOUNTER
Orders added to chart. Not sure if he still needed the calcitriol. Also added vit d and thyroid labs. I wasn't quite sure if patient was to f/u with endo prn from last note. Will forward results to you when they are available.

## 2024-06-24 NOTE — TELEPHONE ENCOUNTER
Katey from [facility] called for Calcitriol and BMP to be added to the lab orders.    He is there now to have them done.

## 2024-06-25 NOTE — TELEPHONE ENCOUNTER
At last visit, patient was instructed to follow up PRN with this office and to complete annual labs with his PCP. I am unsure why our office was contacted for lab orders. Will review results, once available.

## 2024-06-27 LAB — 1,25(OH)2D SERPL-MCNC: 38.7 PG/ML (ref 24.8–81.5)

## 2024-08-22 ENCOUNTER — OFFICE VISIT (OUTPATIENT)
Dept: INTERNAL MEDICINE | Facility: CLINIC | Age: 74
End: 2024-08-22
Payer: MEDICARE

## 2024-08-22 VITALS
RESPIRATION RATE: 16 BRPM | OXYGEN SATURATION: 98 % | BODY MASS INDEX: 34.06 KG/M2 | WEIGHT: 217 LBS | HEART RATE: 60 BPM | DIASTOLIC BLOOD PRESSURE: 65 MMHG | HEIGHT: 67 IN | SYSTOLIC BLOOD PRESSURE: 126 MMHG | TEMPERATURE: 97.5 F

## 2024-08-22 DIAGNOSIS — E21.3 HYPERPARATHYROIDISM: Primary | ICD-10-CM

## 2024-08-22 DIAGNOSIS — M54.6 CHRONIC RIGHT-SIDED THORACIC BACK PAIN: ICD-10-CM

## 2024-08-22 DIAGNOSIS — N20.0 NEPHROLITHIASIS: ICD-10-CM

## 2024-08-22 DIAGNOSIS — G25.0 ESSENTIAL TREMOR: ICD-10-CM

## 2024-08-22 DIAGNOSIS — R73.9 HYPERGLYCEMIA: ICD-10-CM

## 2024-08-22 DIAGNOSIS — G89.29 CHRONIC RIGHT-SIDED THORACIC BACK PAIN: ICD-10-CM

## 2024-08-22 LAB
EXPIRATION DATE: NORMAL
HBA1C MFR BLD: 5.5 % (ref 4.5–5.7)
Lab: NORMAL

## 2024-08-22 NOTE — PROGRESS NOTES
The ABCs of the Annual Wellness Visit  Subsequent Medicare Wellness Visit    Subjective      Adonay Rivas is a 74 y.o. male who presents for a Subsequent Medicare Wellness Visit.  In addition to this has been complaining of a 3-month history of right-sided thoracic back pain without a history of trauma he has a history of nephrolithiasis but his symptoms currently do not appear to be similar to those in the past.  No history of rash.  Has tried some stretching exercises without much relief    Review of Systems   Constitutional: Negative.  Negative for activity change, appetite change, fatigue and fever.   HENT:  Negative for congestion, ear discharge, ear pain and trouble swallowing.    Eyes:  Negative for photophobia and visual disturbance.   Respiratory:  Negative for cough and shortness of breath.    Cardiovascular:  Negative for chest pain and palpitations.   Gastrointestinal:  Negative for abdominal distention, abdominal pain, constipation, diarrhea, nausea and vomiting.   Endocrine: Negative.    Genitourinary:  Negative for dysuria, hematuria and urgency.   Musculoskeletal:  Positive for arthralgias. Negative for back pain, joint swelling and myalgias.   Skin:  Negative for color change and rash.   Allergic/Immunologic: Negative.    Neurological:  Negative for dizziness, weakness, light-headedness and headaches.   Hematological:  Negative for adenopathy. Does not bruise/bleed easily.   Psychiatric/Behavioral:  Negative for agitation, confusion and dysphoric mood. The patient is not nervous/anxious.         The following portions of the patient's history were reviewed and   updated as appropriate: allergies, current medications, past family history, past medical history, past social history, past surgical history, and problem list.    Compared to one year ago, the patient feels his physical   health is the same.    Compared to one year ago, the patient feels his mental   health is better.    Recent  "Hospitalizations:  He was not admitted to the hospital during the last year.       Current Medical Providers:  Patient Care Team:  Checo Kidd MD as PCP - General (Internal Medicine)  Ashley Dillard MD as Consulting Physician (Endocrinology)    Outpatient Medications Prior to Visit   Medication Sig Dispense Refill    latanoprost (XALATAN) 0.005 % ophthalmic solution Administer 1 drop to both eyes Daily. In am  3    propranolol (INDERAL) 40 MG tablet Take 1 tablet by mouth 3 (Three) Times a Day As Needed (tremors). 60 tablet 2    timolol (TIMOPTIC) 0.5 % ophthalmic solution Administer 1 drop to both eyes Every Night. In PM       No facility-administered medications prior to visit.       No opioid medication identified on active medication list. I have reviewed chart for other potential  high risk medication/s and harmful drug interactions in the elderly.        Aspirin is not on active medication list.  Aspirin use is not indicated based on review of current medical condition/s. Risk of harm outweighs potential benefits.  .    Patient Active Problem List   Diagnosis    Essential tremor    Nephrolithiasis    Chronic midline low back pain without sciatica    Hyperparathyroidism     Advance Care Planning  Advance Directive is not on file.  ACP discussion was held with the patient during this visit. Patient has an advance directive (not in EMR), copy requested.     Objective    Vitals:    08/22/24 1518   BP: 126/65   Pulse: 60   Resp: 16   Temp: 97.5 °F (36.4 °C)   SpO2: 98%   Weight: 98.4 kg (217 lb)   Height: 170.2 cm (67\")   PainSc:   2   PainLoc: Back     Estimated body mass index is 33.99 kg/m² as calculated from the following:    Height as of this encounter: 170.2 cm (67\").    Weight as of this encounter: 98.4 kg (217 lb).    Physical Exam  Constitutional:       General: He is not in acute distress.     Appearance: He is well-developed.   HENT:      Nose: Nose normal.   Eyes:      General: No scleral " icterus.     Conjunctiva/sclera: Conjunctivae normal.   Neck:      Thyroid: No thyromegaly.      Trachea: No tracheal deviation.   Cardiovascular:      Rate and Rhythm: Normal rate and regular rhythm.      Heart sounds: No murmur heard.     No friction rub.   Pulmonary:      Effort: No respiratory distress.      Breath sounds: No wheezing or rales.   Abdominal:      General: There is no distension.      Palpations: Abdomen is soft. There is no mass.      Tenderness: There is no abdominal tenderness. There is no guarding.   Musculoskeletal:         General: No deformity. Normal range of motion.   Lymphadenopathy:      Cervical: No cervical adenopathy.   Skin:     General: Skin is warm and dry.      Findings: No erythema or rash.   Neurological:      Mental Status: He is alert and oriented to person, place, and time.      Cranial Nerves: No cranial nerve deficit.      Coordination: Coordination normal.      Deep Tendon Reflexes: Reflexes are normal and symmetric.      Comments: tremors   Psychiatric:         Behavior: Behavior normal.         Thought Content: Thought content normal.         Judgment: Judgment normal.       BMI is >= 30 and <35. (Class 1 Obesity). The following options were offered after discussion;: weight loss educational material (shared in after visit summary) and nutrition counseling/recommendations      Does the patient have evidence of cognitive impairment?   No            HEALTH RISK ASSESSMENT    Smoking Status:  Social History     Tobacco Use   Smoking Status Never   Smokeless Tobacco Never     Alcohol Consumption:  Social History     Substance and Sexual Activity   Alcohol Use No     Fall Risk Screen:    LEANDROADI Fall Risk Assessment was completed, and patient is at LOW risk for falls.Assessment completed on:2024    Depression Screenin/22/2024     3:20 PM   PHQ-2/PHQ-9 Depression Screening   Little Interest or Pleasure in Doing Things 0-->not at all   Feeling Down, Depressed or  Hopeless 0-->not at all   PHQ-9: Brief Depression Severity Measure Score 0       Health Habits and Functional and Cognitive Screenin/22/2024     3:19 PM   Functional & Cognitive Status   Do you have difficulty preparing food and eating? No   Do you have difficulty bathing yourself, getting dressed or grooming yourself? No   Do you have difficulty using the toilet? No   Do you have difficulty moving around from place to place? No   Do you have trouble with steps or getting out of a bed or a chair? No   Current Diet Well Balanced Diet   Dental Exam Up to date   Eye Exam Up to date   Exercise (times per week) 7 times per week   Current Exercises Include Walking;Yard Work;Other   Do you need help using the phone?  No   Are you deaf or do you have serious difficulty hearing?  No   Do you need help to go to places out of walking distance? No   Do you need help shopping? No   Do you need help preparing meals?  No   Do you need help with housework?  No   Do you need help with laundry? No   Do you need help taking your medications? No   Do you need help managing money? No   Do you ever drive or ride in a car without wearing a seat belt? No   Have you felt unusual stress, anger or loneliness in the last month? No   Who do you live with? Spouse   If you need help, do you have trouble finding someone available to you? No   Have you been bothered in the last four weeks by sexual problems? No   Do you have difficulty concentrating, remembering or making decisions? No       Age-appropriate Screening Schedule:  Refer to the list below for future screening recommendations based on patient's age, sex and/or medical conditions. Orders for these recommended tests are listed in the plan section. The patient has been provided with a written plan.    Health Maintenance   Topic Date Due    TDAP/TD VACCINES (1 - Tdap) Never done    LIPID PANEL  2023    ANNUAL WELLNESS VISIT  2024    BMI FOLLOWUP  2024    INFLUENZA  VACCINE  08/01/2024    COVID-19 Vaccine (7 - 2023-24 season) 11/11/2024 (Originally 3/6/2024)    ZOSTER VACCINE (1 of 2) 11/07/2029 (Originally 7/5/2002)    COLORECTAL CANCER SCREENING  01/13/2030    HEPATITIS C SCREENING  Completed    Pneumococcal Vaccine 65+  Completed                CMS Preventative Services Quick Reference  Risk Factors Identified During Encounter:    Polypharmacy: Medication List reviewed and Medications are appropriate for patient    The above risks/problems have been discussed with the patient.  Pertinent information has been shared with the patient in the After Visit Summary.    Diagnoses and all orders for this visit:    1. Hyperparathyroidism (Primary) status post parathyroidectomy recent levels okay    2. Nephrolithiasis stable encouraged to drink enough fluids denies gross hematuria    3. Essential tremor stable on propranolol    4. Chronic right-sided thoracic back pain appears to have intervertebral disc disease with radiculopathy.  Needs an MRI.  In the past has had clips placed status post vasectomy.  Call placed to radiology who reviewed CT abdomen films of his from the past.  She does not think the patient should have a problem getting an MRI with clips that were placed more than 20 years ago  -     MRI Thoracic Spine Without Contrast; Future        Follow Up:   Next Medicare Wellness visit to be scheduled in 1 year.      An After Visit Summary and PPPS were made available to the patient.

## 2024-08-23 ENCOUNTER — TELEPHONE (OUTPATIENT)
Dept: INTERNAL MEDICINE | Facility: CLINIC | Age: 74
End: 2024-08-23
Payer: MEDICARE

## 2024-08-23 NOTE — TELEPHONE ENCOUNTER
----- Message from Checo Kidd sent at 8/22/2024  5:13 PM EDT -----  Please inform patient labs are okay.

## 2024-09-26 ENCOUNTER — HOSPITAL ENCOUNTER (OUTPATIENT)
Dept: MRI IMAGING | Facility: HOSPITAL | Age: 74
Discharge: HOME | End: 2024-09-26
Admitting: INTERNAL MEDICINE
Payer: MEDICARE

## 2024-09-26 DIAGNOSIS — G89.29 CHRONIC RIGHT-SIDED THORACIC BACK PAIN: ICD-10-CM

## 2024-09-26 DIAGNOSIS — M54.6 CHRONIC RIGHT-SIDED THORACIC BACK PAIN: ICD-10-CM

## 2024-09-26 PROCEDURE — 72146 MRI CHEST SPINE W/O DYE: CPT

## 2024-09-27 DIAGNOSIS — M54.10 BACK PAIN WITH RIGHT-SIDED RADICULOPATHY: Primary | ICD-10-CM

## 2024-10-21 ENCOUNTER — OFFICE VISIT (OUTPATIENT)
Dept: NEUROSURGERY | Facility: CLINIC | Age: 74
End: 2024-10-21
Payer: MEDICARE

## 2024-10-21 VITALS — BODY MASS INDEX: 33.59 KG/M2 | WEIGHT: 214 LBS | HEIGHT: 67 IN

## 2024-10-21 DIAGNOSIS — M51.24 THORACIC DISC HERNIATION: Primary | ICD-10-CM

## 2024-10-21 PROCEDURE — 99204 OFFICE O/P NEW MOD 45 MIN: CPT | Performed by: STUDENT IN AN ORGANIZED HEALTH CARE EDUCATION/TRAINING PROGRAM

## 2024-10-21 RX ORDER — ERGOCALCIFEROL 1.25 MG/1
50000 CAPSULE, LIQUID FILLED ORAL WEEKLY
COMMUNITY

## 2024-10-21 NOTE — PROGRESS NOTES
Patient: Adonay Rivas  : 1950    Primary Care Provider: Checo Kidd MD    Requesting Provider: As above      Chief Complaint: Back Pain (Central back pain radiates to right side)      History of Present Illness: This is a 74 y.o. male who presents with 2.5 years of midthoracic back pain.  The patient describes a sharp achy pain in the middle of his thoracic spine.  He says it is somewhat intermittent.  Some days it is worse than others.  He does not notice any correlation with increased activity and increased pain.  He is still very active and ambulatory.  He denies any pain, numbness or weakness in the lower extremities.  He denies any bowel or bladder incontinence.  He briefly tried physical therapy exercises, but states they were not helpful.  He has never had any injections in his back.    PMHX  Allergies:  No Known Allergies  Medications    Current Outpatient Medications:     latanoprost (XALATAN) 0.005 % ophthalmic solution, Administer 1 drop to both eyes Daily. In am, Disp: , Rfl: 3    propranolol (INDERAL) 40 MG tablet, Take 1 tablet by mouth 3 (Three) Times a Day As Needed (tremors)., Disp: 60 tablet, Rfl: 2    timolol (TIMOPTIC) 0.5 % ophthalmic solution, Administer 1 drop to both eyes Every Night. 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    Hyperthyroidism     Kidney stone     Tremor     Tremors of nervous system     essential     Wears glasses      Past Surgical History:  Past Surgical History:   Procedure Laterality Date    CATARACT EXTRACTION, BILATERAL  2020    COLONOSCOPY N/A 2020    Procedure: COLONOSCOPY with hot biopsy polypectomies;  Surgeon: Mara  Cindy HERRERA MD;  Location: [facility] ENDOSCOPY;  Service: Gastroenterology    PARATHYROIDECTOMY N/A 3/31/2023    Procedure: thyroid lobectomy right;  Surgeon: Mane Pedro MD;  Location:  [facility] OR;  Service: General;  Laterality: N/A;    PARATHYROIDECTOMY N/A 5/18/2023    Procedure: RE-DO PARATHYROIDECTOMY;  Surgeon: Mane Pedro MD;  Location:  [facility] OR;  Service: General;  Laterality: N/A;    TEETH EXTRACTION      URETEROSCOPY LASER LITHOTRIPSY WITH STENT INSERTION Left 04/07/2021    Procedure: CYSTOSCOPY, URETEROSCOPY LASER LITHOTRIPSY WITH BASKET EXTRACTION OF STONE FRAGMENTS, RETROGRADE PYELOGRAM AND  STENT INSERTION;  Surgeon: Brenton Walker MD;  Location: [facility] OR;  Service: Urology;  Laterality: Left;    VASECTOMY       Social Hx:  Social History     Tobacco Use    Smoking status: Never     Passive exposure: Never    Smokeless tobacco: Never   Vaping Use    Vaping status: Never Used   Substance Use Topics    Alcohol use: No    Drug use: No     Family Hx:  Family History   Problem Relation Age of Onset    Cancer Mother     Hyperlipidemia Mother     Kidney disease Mother     Stroke Mother     Uterine cancer Mother     Dementia Father     Cancer Sister     Obesity Sister     Cancer Sister     Cancer Sister     Thyroid cancer Sister     Cancer Brother     Leukemia Brother      Review of Systems:        Review of Systems   Constitutional:  Negative for activity change, appetite change, chills, diaphoresis, fatigue, fever and unexpected weight change.   HENT:  Negative for congestion, dental problem, drooling, ear discharge, ear pain, facial swelling, hearing loss, mouth sores, nosebleeds, postnasal drip, rhinorrhea, sinus pressure, sinus pain, sneezing, sore throat, tinnitus, trouble swallowing and voice change.    Eyes:  Negative for photophobia, pain, discharge, redness, itching and visual disturbance.   Respiratory:  Negative for apnea, cough, choking, chest tightness, shortness of breath,  "wheezing and stridor.    Cardiovascular:  Negative for chest pain, palpitations and leg swelling.   Gastrointestinal:  Negative for abdominal distention, abdominal pain, anal bleeding, blood in stool, constipation, diarrhea, nausea, rectal pain and vomiting.   Endocrine: Negative for cold intolerance, heat intolerance, polydipsia, polyphagia and polyuria.   Genitourinary:  Negative for decreased urine volume, difficulty urinating, dysuria, enuresis, flank pain, frequency, genital sores, hematuria and urgency.   Musculoskeletal:  Positive for back pain. Negative for arthralgias, gait problem, joint swelling, myalgias, neck pain and neck stiffness.   Skin:  Negative for color change, pallor, rash and wound.   Allergic/Immunologic: Negative for environmental allergies, food allergies and immunocompromised state.   Neurological:  Negative for dizziness, tremors, seizures, syncope, facial asymmetry, speech difficulty, weakness, light-headedness, numbness and headaches.   Hematological:  Negative for adenopathy. Does not bruise/bleed easily.   Psychiatric/Behavioral:  Negative for agitation, behavioral problems, confusion, decreased concentration, dysphoric mood, hallucinations, self-injury, sleep disturbance and suicidal ideas. The patient is not nervous/anxious and is not hyperactive.    All other systems reviewed and are negative.       Physical Exam:   Ht 170.2 cm (67\")   Wt 97.1 kg (214 lb)   BMI 33.52 kg/m²   Awake, alert and oriented x 3  Speech f/c  Opens eyes spont  Pupils 3 mm rx bilaterally  Extraocular muscles intact bilaterally  Normal sensation to light touch in all 3 distributions of CN V bilaterally  Face symmetric bilaterally  Tongue midline  5/5 in the LE's bilaterally   1+ patellar DTR's    Diagnostic Studies:  All neurological imaging studies were independently reviewed unless stated otherwise    Assessment/Plan:  This is a 74 y.o. male presenting with 2.5 years of midthoracic back pain.  In " reviewing the patient's thoracic MRI, he has a moderate size disc herniation at T7-8.  This results in moderate canal stenosis but there is no signal change within the spinal cord.  Clinically, the patient is not having any symptoms of myelopathy and he has full strength in his lower extremities.  I explained to the patient that given the lack of lower extremity symptoms, there is not a role for surgical intervention on his thoracic disc herniation.  I do think it is likely contributing to his pain however. I recommended he continue doing physical therapy.  I also recommended we refer him to pain management for discussion of injection options.  He was agreeable to that.  We will place that referral today.  I do not think the patient needs to have scheduled follow-up with me at this time, but I told him to call us if he starts to develop any new or worsening lower extremity symptoms.    Diagnoses and all orders for this visit:    1. Thoracic disc herniation (Primary)      Adonay Rivas  reports that he has never smoked. He has never been exposed to tobacco smoke. He has never used smokeless tobacco.        Aj Red MD  10/21/24  10:22 EDT   Island Pedicle Flap With Canthal Suspension Text: The defect edges were debeveled with a #15 scalpel blade.  Given the location of the defect, shape of the defect and the proximity to free margins an island pedicle advancement flap was deemed most appropriate.  Using a sterile surgical marker, an appropriate advancement flap was drawn incorporating the defect, outlining the appropriate donor tissue and placing the expected incisions within the relaxed skin tension lines where possible. The area thus outlined was incised deep to adipose tissue with a #15 scalpel blade.  The skin margins were undermined to an appropriate distance in all directions around the primary defect and laterally outward around the island pedicle utilizing iris scissors.  There was minimal undermining beneath the pedicle flap. A suspension suture was placed in the canthal tendon to prevent tension and prevent ectropion.

## 2024-10-24 ENCOUNTER — PATIENT ROUNDING (BHMG ONLY) (OUTPATIENT)
Dept: NEUROSURGERY | Facility: CLINIC | Age: 74
End: 2024-10-24
Payer: MEDICARE

## 2024-10-24 NOTE — PROGRESS NOTES
A MY-CHART MESSAGE HAS BEEN SENT TO THE PATIENT FOR [place] [facility].

## 2024-12-17 ENCOUNTER — TELEPHONE (OUTPATIENT)
Dept: SURGERY | Facility: CLINIC | Age: 74
End: 2024-12-17
Payer: MEDICARE

## 2024-12-17 NOTE — TELEPHONE ENCOUNTER
Spoke with Adonay. He ask if he could call back to update information and schedule with Bettina Pt on 5 yr colonoscopy recall.Mail out letter.

## (undated) DEVICE — FRCP BIOP RADLJAW4 HOT 2.2X240 BX40

## (undated) DEVICE — SOL IRR NACL 0.9PCT 3000ML

## (undated) DEVICE — DISPOSABLE BIPOLAR FORCEPS 4" (10.2CM) JEWELERS, STRAIGHT 0.4MM TIP AND 12 FT. (3.6M) CABLE: Brand: KIRWAN

## (undated) DEVICE — ANTIBACTERIAL UNDYED BRAIDED (POLYGLACTIN 910), SYNTHETIC ABSORBABLE SUTURE: Brand: COATED VICRYL

## (undated) DEVICE — Device

## (undated) DEVICE — ELECTRD BLD EZ CLN MOD XLNG 2.75IN

## (undated) DEVICE — PK MINOR SPLT 10

## (undated) DEVICE — GLV SURG SENSICARE LT W/ALOE PF LF 7 STRL

## (undated) DEVICE — PATIENT RETURN ELECTRODE, SINGLE-USE, CONTACT QUALITY MONITORING, ADULT, WITH 9FT CORD, FOR PATIENTS WEIGING OVER 33LBS. (15KG): Brand: MEGADYNE

## (undated) DEVICE — ST FLD IRR WARM

## (undated) DEVICE — SUT SILK 3/0 TIES 18IN A184H

## (undated) DEVICE — SUT SILK 4/0 TIES 18IN A183H

## (undated) DEVICE — HYBRID TUBING/CAP SET FOR OLYMPUS® SCOPES: Brand: ERBE

## (undated) DEVICE — TRAP FLD MINIVAC MEGADYNE 100ML

## (undated) DEVICE — HARMONIC FOCUS SHEARS 9CM LENGTH + ADAPTIVE TISSUE TECHNOLOGY FOR USE WITH BLUE HAND PIECE ONLY: Brand: HARMONIC FOCUS

## (undated) DEVICE — DRSNG SURESITE WNDW 4X4.5

## (undated) DEVICE — INTENDED USE FOR SURGICAL MARKING ON INTACT SKIN, ALSO PROVIDES A PERMANENT METHOD OF IDENTIFYING OBJECTS IN THE OPERATING ROOM: Brand: WRITESITE® REGULAR TIP SKIN MARKER

## (undated) DEVICE — SYR LUER SLPTP 50ML

## (undated) DEVICE — LUBE JELLY PK/2.75GM STRL BX/144

## (undated) DEVICE — GLV SURG BIOGEL LTX PF 7

## (undated) DEVICE — SUT SILK 2/0 FS BLK 18IN 685G

## (undated) DEVICE — ENDOSCOPY PORT CONNECTOR FOR OLYMPUS® SCOPES: Brand: ERBE

## (undated) DEVICE — DUAL LUMEN URETERAL CATHETER

## (undated) DEVICE — PROBE 8225825 3PK INCREMT STD PRASS ROHS

## (undated) DEVICE — UNDERGLV SURG BIOGEL INDICATOR LF PF 7.5

## (undated) DEVICE — MEDI-VAC NON-CONDUCTIVE SUCTION TUBING: Brand: CARDINAL HEALTH

## (undated) DEVICE — APPL CHLORAPREP 26ML CLR

## (undated) DEVICE — URETERAL ACCESS SHEATH SET: Brand: NAVIGATOR HD

## (undated) DEVICE — GW AMPLTZ SUPRSTF PTFE JB .035 7X145CM

## (undated) DEVICE — PAD GRND REM POLYHESIVE A/ DISP

## (undated) DEVICE — SUT MNCRYL PLS ANTIB UD 4/0 PS2 18IN

## (undated) DEVICE — DISH PETRI 3.5IN MD STRL LF

## (undated) DEVICE — SPNG LAP CIGARETTE KITTNER 5MM STRL PK/5

## (undated) DEVICE — RICH CYSTO: Brand: MEDLINE INDUSTRIES, INC.

## (undated) DEVICE — TOWEL,OR,DSP,ST,BLUE,STD,4/PK,20PK/CS: Brand: MEDLINE

## (undated) DEVICE — CATH URETRL AP 18F

## (undated) DEVICE — SLV SCD CALF HEMOFORCE DVT THERP REPROC MD

## (undated) DEVICE — ADAPT UROLOK

## (undated) DEVICE — TBG PENCL TELESCP MEGADYNE SMOKE EVAC 10FT

## (undated) DEVICE — NITINOL STONE RETRIEVAL BASKET: Brand: ZERO TIP

## (undated) DEVICE — Device: Brand: DEFENDO AIR/WATER/SUCTION AND BIOPSY VALVE

## (undated) DEVICE — GLV SURG SENSICARE W/ALOE PF LF 7.5 STRL

## (undated) DEVICE — NITINOL WIRE WITH HYDROPHILIC TIP: Brand: SENSOR